# Patient Record
Sex: MALE | Race: ASIAN | NOT HISPANIC OR LATINO | Employment: FULL TIME | URBAN - METROPOLITAN AREA
[De-identification: names, ages, dates, MRNs, and addresses within clinical notes are randomized per-mention and may not be internally consistent; named-entity substitution may affect disease eponyms.]

---

## 2017-01-03 ENCOUNTER — TELEPHONE (OUTPATIENT)
Dept: ENDOCRINOLOGY | Facility: CLINIC | Age: 42
End: 2017-01-03

## 2017-01-03 DIAGNOSIS — E05.90 HYPERTHYROIDISM: Primary | ICD-10-CM

## 2017-01-03 NOTE — TELEPHONE ENCOUNTER
[facility] Call Center    Phone Message    Name of Caller: Brandon    Phone Number: Cell number on file:    Telephone Information:   Mobile [phone]       Best time to return call: any    May a detailed message be left on voicemail: yes    Relation to patient: Self    Reason for Call: Requesting Results   Name/type of test: endo labs  Date of test: in chart  Was test done at a location other than [facility] (Please fill in the location if not [facility])?: No      Action Taken: Message routed to:  Adult Clinics: Endocrinology p [phone]

## 2017-01-04 NOTE — TELEPHONE ENCOUNTER
Please call patient back. [phone]. He called twice for results and no one has called him back.  Endo requested message to care team.

## 2017-01-04 NOTE — TELEPHONE ENCOUNTER
"Per Epic letter section, Dr. Mercado sent letter to patient on 12/31/16:  \"Dear Brandon:    Thank you for allowing me to participate in your care. Your recent test results were reviewed and listed below.      Based on this results you have Graves' disease causing hyperthyroidism. Continue taking medications that were prescribed to you and follow up as scheduled.    Your results are provided below for your review      ENDO THYROID LABS-[facility] Latest Ref Rng 12/23/2016   TSH 0.40 - 4.00 mU/L <0.01 (L)   T4 FREE 0.76 - 1.46 ng/dL 1.88 (H)   TRIIODOTHYRONINE(T3) 60 - 181 ng/dL 229 (H)   THYR PEROXIDASE EVELIO <35 IU/mL    THYROID STIM IMMUNOG  5.0 (H)       Examination: Nuclear medicine thyroid uptake and scan, on 12/29/2016  3:25 PM.     Indication:  Thyrotoxicosis     Additional Information: None.     Technique:     The patient received 204 uci of I-123 orally.  Uptake measurements and  scan was obtained at 24 hours.     Findings:     The laboratory assessment for hyperthyroidism determined elevated T3,  T4, and thyroid-stimulating immunoglobulins with undetectable TSH.     The uptake at 24 hours was measured at 65.7%.  The normal range at 24  hours is from 10 to 30%.  Uptake on right: 34.1%,  Uptake on Left: 31.6%.     Total computer estimated weight of the gland: 62.1 gm,  Right gland  weight: 34.4 gm,  Left gland weight: 27.7 gm.     Images of the gland demonstrates normal appearance of the right and  left lobes. No additional findings. No autonomous nodules were  identified.                                                                       Impression: 65.7% uptake at 24 hours is increased, consistent with  Graves' disease.    Thank you for choosing [place]. As a result, please continue with the treatment plan discussed in the office. Return as discussed or sooner if symptoms worsens or fail to improve. If you have any further questions or concerns, please do not hesitate to contact us.      Sincerely,  Jayson" "Jess  [facility] ENDOCRINOLOGY  [address] [address]  Phone: [phone]  Fax: [phone] \"          Patient advised of above results and recommendations from Dr. Mercado. Patient verbalizes understanding. Patient reports that he does need a refill on his Methimazole 10 mg TID. He notes that for some reason all his pills have broke into pieces and he is is trying to make sure he is actually getting the correct dosing.     Will send to Dr. Mercado for refill on Methimazole (not on endocrine refill protocol).       Carine Diggs, RN  Endocrine Care Coordinator  [facility]      "

## 2017-01-05 RX ORDER — METHIMAZOLE 10 MG/1
10 TABLET ORAL 3 TIMES DAILY
Qty: 90 TABLET | Refills: 0 | Status: SHIPPED | OUTPATIENT
Start: 2017-01-05 | End: 2017-02-17

## 2017-02-17 ENCOUNTER — OFFICE VISIT (OUTPATIENT)
Dept: ENDOCRINOLOGY | Facility: CLINIC | Age: 42
End: 2017-02-17
Payer: COMMERCIAL

## 2017-02-17 VITALS
BODY MASS INDEX: 28.06 KG/M2 | HEART RATE: 56 BPM | SYSTOLIC BLOOD PRESSURE: 123 MMHG | HEIGHT: 65 IN | DIASTOLIC BLOOD PRESSURE: 80 MMHG | WEIGHT: 168.43 LBS

## 2017-02-17 DIAGNOSIS — E05.90 HYPERTHYROIDISM: ICD-10-CM

## 2017-02-17 LAB
ALT SERPL W P-5'-P-CCNC: 45 U/L (ref 0–70)
BASOPHILS # BLD AUTO: 0 10E9/L (ref 0–0.2)
BASOPHILS NFR BLD AUTO: 0.2 %
DIFFERENTIAL METHOD BLD: NORMAL
EOSINOPHIL # BLD AUTO: 0.2 10E9/L (ref 0–0.7)
EOSINOPHIL NFR BLD AUTO: 2.4 %
ERYTHROCYTE [DISTWIDTH] IN BLOOD BY AUTOMATED COUNT: 13.9 % (ref 10–15)
HCT VFR BLD AUTO: 47.6 % (ref 40–53)
HGB BLD-MCNC: 16.7 G/DL (ref 13.3–17.7)
LYMPHOCYTES # BLD AUTO: 3.5 10E9/L (ref 0.8–5.3)
LYMPHOCYTES NFR BLD AUTO: 40.1 %
MCH RBC QN AUTO: 31 PG (ref 26.5–33)
MCHC RBC AUTO-ENTMCNC: 35.1 G/DL (ref 31.5–36.5)
MCV RBC AUTO: 88 FL (ref 78–100)
MONOCYTES # BLD AUTO: 0.7 10E9/L (ref 0–1.3)
MONOCYTES NFR BLD AUTO: 7.8 %
NEUTROPHILS # BLD AUTO: 4.4 10E9/L (ref 1.6–8.3)
NEUTROPHILS NFR BLD AUTO: 49.5 %
PLATELET # BLD AUTO: 180 10E9/L (ref 150–450)
RBC # BLD AUTO: 5.39 10E12/L (ref 4.4–5.9)
T4 FREE SERPL-MCNC: 0.85 NG/DL (ref 0.76–1.46)
TSH SERPL DL<=0.05 MIU/L-ACNC: <0.01 MU/L (ref 0.4–4)
WBC # BLD AUTO: 8.8 10E9/L (ref 4–11)

## 2017-02-17 PROCEDURE — 99214 OFFICE O/P EST MOD 30 MIN: CPT | Performed by: INTERNAL MEDICINE

## 2017-02-17 PROCEDURE — 84439 ASSAY OF FREE THYROXINE: CPT | Performed by: INTERNAL MEDICINE

## 2017-02-17 PROCEDURE — 84443 ASSAY THYROID STIM HORMONE: CPT | Performed by: INTERNAL MEDICINE

## 2017-02-17 PROCEDURE — 84460 ALANINE AMINO (ALT) (SGPT): CPT | Performed by: INTERNAL MEDICINE

## 2017-02-17 PROCEDURE — 36415 COLL VENOUS BLD VENIPUNCTURE: CPT | Performed by: INTERNAL MEDICINE

## 2017-02-17 PROCEDURE — 85025 COMPLETE CBC W/AUTO DIFF WBC: CPT | Performed by: INTERNAL MEDICINE

## 2017-02-17 RX ORDER — METHIMAZOLE 10 MG/1
10 TABLET ORAL 3 TIMES DAILY
Qty: 90 TABLET | Refills: 0 | Status: SHIPPED | OUTPATIENT
Start: 2017-02-17 | End: 2017-02-20

## 2017-02-17 NOTE — MR AVS SNAPSHOT
After Visit Summary   2/17/2017    Brandon Macias    MRN: [id number]           Patient Information     Date Of Birth          1975        Visit Information        Provider Department      2/17/2017 11:00 AM Jayson Mercado MD [facility]        Today's Diagnoses     Hyperthyroidism          Care Instructions    Thank you for choosing the [facility], Department of Endocrinology. It has been a pleasure servicing you today. Please contact us at [phone] with any questions. If you need to speak to an Endocrinologist and it is after 5:00 pm or on a weekend, please call the On-Call Endocrinologist at [phone].          Follow-ups after your visit        Additional Services     OPHTHALMOLOGY ADULT REFERRAL       Your provider has referred you to: [facility]: [facility] [phone]   [email or web address]    Please be aware that coverage of these services is subject to the terms and limitations of your health insurance plan.  Call member services at your health plan with any benefit or coverage questions.      Please bring the following with you to your appointment:    (1) Any X-Rays, CTs or MRIs which have been performed.  Contact the facility where they were done to arrange for  prior to your scheduled appointment.    (2) List of current medications  (3) This referral request   (4) Any documents/labs given to you for this referral                  Your next 10 appointments already scheduled     May 25, 2017  6:30 PM CDT   LAB with BK LAB   [facility]    [address] [place] [address] [phone]           Patient must bring picture ID.  Patient should be prepared to give a urine specimen  Please do not eat 10-12 hours before your appointment if you are coming in fasting for labs on lipids,  cholesterol, or glucose (sugar).  Pregnant women should follow their Care Team instructions. Water with medications is okay. Do not drink coffee or other fluids.   If you have concerns about taking  your medications, please ask at office or if scheduling via [employer], send a message by clicking on Secure Messaging, Message Your Care Team.            Jun 02, 2017  4:00 PM CDT   Return Visit with Jayson Mercado MD   [facility] ([facility]    [address] [place] [address] [phone]              Future tests that were ordered for you today     Open Standing Orders        Priority Remaining Interval Expires Ordered    TSH Routine 4/4 12 months 2/17/2018 2/17/2017    T4 free Routine 4/4 12 months 2/17/2018 2/17/2017    **CBC with platelets differential FUTURE 2mo Routine 4/4 12 months 2/17/2018 2/17/2017    ALT Routine 4/4 12 months 2/17/2018 2/17/2017            Who to contact     If you have questions or need follow up information about today's clinic visit or your schedule please contact [facility] directly at [phone].  Normal or non-critical lab and imaging results will be communicated to you by [employer], letter or phone within 4 business days after the clinic has received the results. If you do not hear from us within 7 days, please contact the clinic through [employer] or phone. If you have a critical or abnormal lab result, we will notify you by phone as soon as possible.  Submit refill requests through [employer] or call your pharmacy and they will forward the refill request to us. Please allow 3 business days for your refill to be completed.          Additional Information About Your Visit        [employer] Information     [employer] is an electronic gateway that provides easy, online access to your medical records. With [employer], you can request a clinic appointment, read your test results, renew a prescription or communicate with  "your care team.     To sign up for [employer] visit the website at [email or web address]   You will be asked to enter the access code listed below, as well as some personal information. Please follow the directions to create your username and password.     Your access code is: [email or web address]  Expires: 2017 11:01 AM     Your access code will  in 90 days. If you need help or a new code, please contact your [facility] or call [phone] for assistance.        Care EveryWhere ID     This is your Care EveryWhere ID. This could be used by other organizations to access your [facility] medical records  [id number]        Your Vitals Were     Pulse Height BMI (Body Mass Index)             56 1.651 m (5' 5\") 28.03 kg/m2          Blood Pressure from Last 3 Encounters:   17 123/80   16 118/65   16 124/69    Weight from Last 3 Encounters:   17 76.4 kg (168 lb 6.9 oz)   16 72.7 kg (160 lb 4.8 oz)   16 72.1 kg (159 lb)              We Performed the Following     OPHTHALMOLOGY ADULT REFERRAL          Where to get your medicines      These medications were sent to [id number] [address] [facility]  [address] [facility] [address]    Hours:  24-hours Phone:  [phone]     methimazole 10 MG tablet          Primary Care Provider Office Phone # Fax #    Jack Horton [phone]       [facility] [address]  [place] [address]        Thank you!     Thank you for choosing [facility]  for your care. Our goal is always to provide you with excellent care. Hearing back from our patients is one way we can continue to improve our services. Please take a few minutes to complete the written survey that you may receive in the mail after your visit with us. Thank you!             Your Updated Medication List - Protect others around you: " Learn how to safely use, store and throw away your medicines at www.disposemymeds.org.          This list is accurate as of: 2/17/17 11:01 AM.  Always use your most recent med list.                   Brand Name Dispense Instructions for use    atenolol 50 MG tablet    TENORMIN    90 tablet    Take 1 tablet (50 mg) by mouth daily       methimazole 10 MG tablet    TAPAZOLE    90 tablet    Take 1 tablet (10 mg) by mouth 3 times daily

## 2017-02-17 NOTE — PROGRESS NOTES
Endocrine Clinic Consult:     Reason for consult: Hyperthyroidism  Date: December 23, 2016  Referring Physician: Jack Horton      HPI:     Brandon Macias is a 41 year old year old male who presents for evaluation of  Hyperthyroidism. He went to routine physical exam and reported weight loss for 2 months and occasional palpitations in episodes lasting 5 minutes.  Thyroid function test was obtained at that time.  He was started on Methimazole and Atenolol 50 mg daily.     ENDO THYROID LABS-[facility] Latest Ref Rng 11/25/2016 11/18/2016   TSH 0.40 - 4.00 mU/L <0.01 (L) <0.01 (L)   T4 FREE 0.76 - 1.46 ng/dL 2.56 (H) 3.50 (H)   THYR PEROXIDASE EVELIO <35 IU/mL <10      He presents today after 8 weeks of Methimazole for a follow up visit.   ENDO VITALS-[facility] 2/17/2017 12/23/2016   Weight 76.4 kg 72.712 kg   Weight 168 lb 6.9 oz 160 lb 4.8 oz   Height 65 in 65 in   /80 118/65   Pulse 56 62   BSA (Calculated - sq m) 1.87 1.83   BMI (Calculated) 28.09 26.73     Methimazole held, EDUARDO performed   65.7% uptake at 24 hours is increased, consistent with  Graves' disease.    T/t discussed, patient chose to continue Methimazole.     He has gained about 8 lbs and feels well. He denies having any new symptoms. He has good energy, good sleep.   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     Denies having felt a goiter, neck pain, difficulty in swallowing, difficulty in breathing and changes in voice.     Risk Factors:   No history of childhood radiation to head and neck, No history of thyroid cancer in the family, No history of goiter in the family and No history of autoimmune disorders in the family.       Clinical symptom assessment  Temperature: No temperature intolerance  General: no change in energy level  Eye puffiness no  Tiredness / Fatigue no  Weight gain no, gained weight 8 lbs  Neurological: Difficult with concentration / somnolence no  CVS: Palpitation / History of CAD / Lipid disorder no  Respiratory  "Shortness of breath no  GI No diarrhea  Skin and hair:  No change  Extremity Swelling no  Musculoskeletal: Cramps  no      Other ROS:   No eye symptoms  No headache, change in vision, loss of peripheral vision  Other ROS that were obtained were negative.     No significant illness in the past    Sister with Graves Disease    Past Surgical History   Procedure Laterality Date     No history of surgery       Family History   Problem Relation Age of Onset     Family History Negative       Social History     Social History     Marital status:      Spouse name: N/A     Number of children: N/A     Years of education: N/A     Occupational History     Not on file.     Social History Main Topics     Smoking status: Never Smoker     Smokeless tobacco: Never Used     Alcohol use Yes     Drug use: No     Sexual activity: Yes     Partners: Female     Other Topics Concern     Not on file     Social History Narrative        Allergies   Allergen Reactions     No Known Drug Allergies      Current Outpatient Prescriptions   Medication     methimazole (TAPAZOLE) 10 MG tablet     atenolol (TENORMIN) 50 MG tablet     [DISCONTINUED] methimazole (TAPAZOLE) 10 MG tablet     No current facility-administered medications for this visit.      Social history  Does not smoke.   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     Exam:  /80  Pulse 56  Ht 1.651 m (5' 5\")  Wt 76.4 kg (168 lb 6.9 oz)  BMI 28.03 kg/m2     Constitutional: healthy, alert, no distress and cooperative  Head: Normocephalic. No masses, lesions, tenderness or abnormalities  Neck: Neck supple. No adenopathy. Thyroid palpable normal size, Carotids without bruits.  ENT: No throat congestion, no neck nodes or sinus tenderness  Cardiovascular: regular rhythm, no tachycardia  Respiratory: Lungs clear  Gastrointestinal: Abdomen soft, non-tender. BS normal. No striae  Musculoskeletal: gait normal and normal muscle tone  Neurologic: Normal speech, reflexes normal. "   Psychiatric: mentation appears normal       TSH   Date Value Ref Range Status   12/23/2016 <0.01 (L) 0.40 - 4.00 mU/L Final   11/25/2016 <0.01 (L) 0.40 - 4.00 mU/L Final   11/18/2016 <0.01 (L) 0.40 - 4.00 mU/L Final       T4 Free   Date Value Ref Range Status   12/23/2016 1.88 (H) 0.76 - 1.46 ng/dL Final   11/25/2016 2.56 (H) 0.76 - 1.46 ng/dL Final   11/18/2016 3.50 (H) 0.76 - 1.46 ng/dL Final   ]    CBC RESULTS:   Recent Labs   Lab Test  11/18/16   0747   WBC  7.7   RBC  5.02   HGB  15.2   HCT  44.4   MCV  88   MCH  30.3   MCHC  34.2   RDW  13.1   PLT  186     Recent Labs   Lab Test  11/18/16   0747 03/03/16 03/02/16   0952   NA  139  142  135   POTASSIUM  3.7  3.7  4.0   CHLORIDE  103  107  101   CO2  29   --   27   ANIONGAP  7  8.0  7   GLC  84  137*  130*   BUN  15  14  14   CR  0.62*  0.71  0.64*   TARA  9.0  7.5  8.8     ENDO THYROID LABS-[facility] Latest Ref Rng 11/25/2016 11/18/2016   TSH 0.40 - 4.00 mU/L <0.01 (L) <0.01 (L)   T4 FREE 0.76 - 1.46 ng/dL 2.56 (H) 3.50 (H)   THYR PEROXIDASE EVELIO <35 IU/mL <10        Assessment   Brandon Macias is a 41 year old years old male who is here for evaluation of hyperthyroidism due to Graves disease He has family history of Graves disease. He is currently on Methimazole 10 mg 3 times a day with Atenolol and is asymptomatic.      We again discussed about side effects of methimazole including agranulocytosis, LFT changes, and need for test if he develops fever or sore throat. Standing labs ordered.     If FT4 improving then, reduce dose of Methimazole.     3 month FU with a lab 1 week prior to the visit.    Jayson Mercado MD  [id number]  Endocrinology Service

## 2017-02-17 NOTE — PATIENT INSTRUCTIONS
Thank you for choosing the [facility], Department of Endocrinology. It has been a pleasure servicing you today. Please contact us at [phone] with any questions. If you need to speak to an Endocrinologist and it is after 5:00 pm or on a weekend, please call the On-Call Endocrinologist at [phone].

## 2017-02-17 NOTE — LETTER
2/17/2017      RE: Brandon Macias  [address]  [facility] [address]     Dear Colleague,    Thank you for referring your patient, Brandon Macias, to the [facility]. Please see a copy of my visit note below.    Endocrine Clinic Consult:     Reason for consult: Hyperthyroidism  Date: December 23, 2016  Referring Physician: Jack Horton      HPI:     Brandon Macias is a 41 year old year old male who presents for evaluation of  Hyperthyroidism. He went to routine physical exam and reported weight loss for 2 months and occasional palpitations in episodes lasting 5 minutes.  Thyroid function test was obtained at that time.  He was started on Methimazole and Atenolol 50 mg daily.     ENDO THYROID LABS-[facility] Latest Ref Rng 11/25/2016 11/18/2016   TSH 0.40 - 4.00 mU/L <0.01 (L) <0.01 (L)   T4 FREE 0.76 - 1.46 ng/dL 2.56 (H) 3.50 (H)   THYR PEROXIDASE EVELIO <35 IU/mL <10      He presents today after 8 weeks of Methimazole for a follow up visit.   ENDO VITALS-[facility] 2/17/2017 12/23/2016   Weight 76.4 kg 72.712 kg   Weight 168 lb 6.9 oz 160 lb 4.8 oz   Height 65 in 65 in   /80 118/65   Pulse 56 62   BSA (Calculated - sq m) 1.87 1.83   BMI (Calculated) 28.09 26.73     Methimazole held, EDUARDO performed   65.7% uptake at 24 hours is increased, consistent with  Graves' disease.    T/t discussed, patient chose to continue Methimazole.     He has gained about 8 lbs and feels well. He denies having any new symptoms. He has good energy, good sleep.   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     Denies having felt a goiter, neck pain, difficulty in swallowing, difficulty in breathing and changes in voice.     Risk Factors:   No history of childhood radiation to head and neck, No history of thyroid cancer in the family, No history of goiter in the family and No history of autoimmune disorders in the family.       Clinical symptom assessment  Temperature: No temperature intolerance  General: no change  "in energy level  Eye puffiness no  Tiredness / Fatigue no  Weight gain no, gained weight 8 lbs  Neurological: Difficult with concentration / somnolence no  CVS: Palpitation / History of CAD / Lipid disorder no  Respiratory Shortness of breath no  GI No diarrhea  Skin and hair:  No change  Extremity Swelling no  Musculoskeletal: Cramps  no      Other ROS:   No eye symptoms  No headache, change in vision, loss of peripheral vision  Other ROS that were obtained were negative.     No significant illness in the past    Sister with Graves Disease    Past Surgical History   Procedure Laterality Date     No history of surgery       Family History   Problem Relation Age of Onset     Family History Negative       Social History     Social History     Marital status:      Spouse name: N/A     Number of children: N/A     Years of education: N/A     Occupational History     Not on file.     Social History Main Topics     Smoking status: Never Smoker     Smokeless tobacco: Never Used     Alcohol use Yes     Drug use: No     Sexual activity: Yes     Partners: Female     Other Topics Concern     Not on file     Social History Narrative        Allergies   Allergen Reactions     No Known Drug Allergies      Current Outpatient Prescriptions   Medication     methimazole (TAPAZOLE) 10 MG tablet     atenolol (TENORMIN) 50 MG tablet     [DISCONTINUED] methimazole (TAPAZOLE) 10 MG tablet     No current facility-administered medications for this visit.      Social history  Does not smoke.   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     Exam:  /80  Pulse 56  Ht 1.651 m (5' 5\")  Wt 76.4 kg (168 lb 6.9 oz)  BMI 28.03 kg/m2     Constitutional: healthy, alert, no distress and cooperative  Head: Normocephalic. No masses, lesions, tenderness or abnormalities  Neck: Neck supple. No adenopathy. Thyroid palpable normal size, Carotids without bruits.  ENT: No throat congestion, no neck nodes or sinus tenderness  Cardiovascular: " regular rhythm, no tachycardia  Respiratory: Lungs clear  Gastrointestinal: Abdomen soft, non-tender. BS normal. No striae  Musculoskeletal: gait normal and normal muscle tone  Neurologic: Normal speech, reflexes normal.   Psychiatric: mentation appears normal       TSH   Date Value Ref Range Status   12/23/2016 <0.01 (L) 0.40 - 4.00 mU/L Final   11/25/2016 <0.01 (L) 0.40 - 4.00 mU/L Final   11/18/2016 <0.01 (L) 0.40 - 4.00 mU/L Final       T4 Free   Date Value Ref Range Status   12/23/2016 1.88 (H) 0.76 - 1.46 ng/dL Final   11/25/2016 2.56 (H) 0.76 - 1.46 ng/dL Final   11/18/2016 3.50 (H) 0.76 - 1.46 ng/dL Final   ]    CBC RESULTS:   Recent Labs   Lab Test  11/18/16   0747   WBC  7.7   RBC  5.02   HGB  15.2   HCT  44.4   MCV  88   MCH  30.3   MCHC  34.2   RDW  13.1   PLT  186     Recent Labs   Lab Test  11/18/16   0747 03/03/16 03/02/16   0952   NA  139  142  135   POTASSIUM  3.7  3.7  4.0   CHLORIDE  103  107  101   CO2  29   --   27   ANIONGAP  7  8.0  7   GLC  84  137*  130*   BUN  15  14  14   CR  0.62*  0.71  0.64*   TARA  9.0  7.5  8.8     ENDO THYROID LABS-[facility] Latest Ref Rng 11/25/2016 11/18/2016   TSH 0.40 - 4.00 mU/L <0.01 (L) <0.01 (L)   T4 FREE 0.76 - 1.46 ng/dL 2.56 (H) 3.50 (H)   THYR PEROXIDASE EVELIO <35 IU/mL <10        Assessment   Brandon Macias is a 41 year old years old male who is here for evaluation of hyperthyroidism due to Graves disease He has family history of Graves disease. He is currently on Methimazole 10 mg 3 times a day with Atenolol and is asymptomatic.      We again discussed about side effects of methimazole including agranulocytosis, LFT changes, and need for test if he develops fever or sore throat. Standing labs ordered.     If FT4 improving then, reduce dose of Methimazole.     3 month FU with a lab 1 week prior to the visit.    Jayson Mercado MD  [id number]  Endocrinology Service            Again, thank you for allowing me to participate in the care of your patient.       Sincerely,    Jayson Mercado MD

## 2017-02-20 DIAGNOSIS — E05.90 HYPERTHYROIDISM: ICD-10-CM

## 2017-02-20 RX ORDER — METHIMAZOLE 5 MG/1
5 TABLET ORAL DAILY
Qty: 90 TABLET | Refills: 3 | Status: SHIPPED | OUTPATIENT
Start: 2017-02-20 | End: 2019-04-15

## 2017-02-20 NOTE — TELEPHONE ENCOUNTER
Patient notified of recommendations. He is agreeable with plan. He will schedule a lab appt in 2 months at a local  Clinic. He had no further questions at this time    Cheyenne Garcia LPN  Adult Endocrinology  [facility]    Per Dr. Mercado Message    Hi Cheyenne,     Could you please communicate with patient regarding the dose reduction to once a day and retest in 2 months.     Also, please ask him to stop Atenolol that he takes daily.     Thanks   Jayson Mercado MD   [id number]   Endocrinology Service   (Routing comment)

## 2017-02-20 NOTE — TELEPHONE ENCOUNTER
[facility] Call Center    Phone Message    Name of Caller: Brandon    Phone Number: Home number on file [phone] (home) or Cell number on file:    Telephone Information:   Mobile [phone]       Best time to return call: Any    May a detailed message be left on voicemail: yes    Relation to patient: Self    Reason for Call: Medication Refill Request    Has the patient contacted the pharmacy for the refill? Yes  Medication: methimazole (TAPAZOLE) 10 MG tablet [28756]   Pharmacy: [facility] [address] [place] [address] AT [facility]--Patient is scheduled on 06/02/17 with Dr. Mercado. Please advise.       Action Taken: Message routed to:  Adult Clinics: Endocrinology p [phone]

## 2017-02-20 NOTE — TELEPHONE ENCOUNTER
Medication not on Endocrinology Refill Protocol. Will route to Dr. Mercado for review.    Zeinab Mendoza LPN  Adult Endocrinology  [facility]

## 2017-02-21 NOTE — PROGRESS NOTES
VM left  Sent msg to team regarding dose adjustment based on the lab results.     Methimazole 5 mg daily    Jayson Mercado MD  [id number]  Endocrinology Service

## 2017-05-16 ENCOUNTER — DOCUMENTATION ONLY (OUTPATIENT)
Dept: LAB | Facility: CLINIC | Age: 42
End: 2017-05-16

## 2017-05-16 NOTE — PROGRESS NOTES
Patient is scheduled for a Lab appointment on 5/25/2017 for labs for Dr. Mercado.  Please enter future orders, or call to advise patient to cancel appointment if labs are not needed.  Thank you.

## 2019-02-18 ENCOUNTER — OFFICE VISIT (OUTPATIENT)
Dept: OPTOMETRY | Facility: CLINIC | Age: 44
End: 2019-02-18
Payer: COMMERCIAL

## 2019-02-18 DIAGNOSIS — H11.31 SUBCONJUNCTIVAL HEMORRHAGE OF RIGHT EYE: ICD-10-CM
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
## 2019-02-18 PROCEDURE — 92002 INTRM OPH EXAM NEW PATIENT: CPT | Performed by: OPTOMETRIST

## 2019-02-18 ASSESSMENT — SLIT LAMP EXAM - LIDS
COMMENTS: NORMAL
COMMENTS: NORMAL

## 2019-02-18 ASSESSMENT — TONOMETRY
OS_IOP_MMHG: 27
OD_IOP_MMHG: 27
OS_IOP_MMHG: 17
IOP_METHOD: APPLANATION
IOP_METHOD: TONOPEN
OD_IOP_MMHG: 17
OS_IOP_MMHG: 26
OD_IOP_MMHG: 25
IOP_METHOD: TONOPEN

## 2019-02-18 ASSESSMENT — CUP TO DISC RATIO
OD_RATIO: 0.2
OS_RATIO: 0.3

## 2019-02-18 ASSESSMENT — CONF VISUAL FIELD
OD_NORMAL: 1
OS_NORMAL: 1
METHOD: COUNTING FINGERS

## 2019-02-18 ASSESSMENT — VISUAL ACUITY
OD_SC+: -2
OS_SC: 20/20
OD_SC: 20/20
METHOD: SNELLEN - LINEAR

## 2019-02-18 ASSESSMENT — EXTERNAL EXAM - RIGHT EYE: OD_EXAM: NORMAL

## 2019-02-18 ASSESSMENT — EXTERNAL EXAM - LEFT EYE: OS_EXAM: NORMAL
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
## 2019-02-18 NOTE — PATIENT INSTRUCTIONS
You have a broken blood vessel in your eye.  The redness may take a few weeks to resolve.  Visine or Clear Eyes drops will not make the redness go away any faster.  Sometimes it looks worse before it gets better.    Recommend annual eye exams.    Wander Mandujano, JAVIER    The affects of the dilating drops last for 4- 6 hours.  You will be more sensitive to light and vision will be blurry up close.  Mydriatic sunglasses were given if needed.      Optometry Providers       Clinic Locations                                 Telephone Number   Dr. Dyana Dang [facility] [place]   [place] [phone]     [facility] Hours:                [facility] Hours:       [facility] Hours:   [address]   [address] [facility]     [address] [facility] [place] [address]   [place] [address]    [place] [address]  Phone: [phone]                    Phone: [phone]     Phone: [phone]                      Monday 8:00-7:00                          Monday 8:00-7:00                          Monday 8:00-7:00              Tuesday 8:00-6:00                          Tuesday 8:00-7:00                          Tuesday 8:00-7:00              Wednesday 8:00-6:00                  Wednesday 8:00-7:00                   Wednesday 8:00-7:00      Thursday 8:00-6:00                        Thursday 8:00-7:00                         Thursday 8:00-7:00            Friday 8:00-5:00                              Friday 8:00-5:00                              Friday 8:00-5:00    [facility] Hours:   [address] [facility] [address] [id number] [phone]    Monday 8:00-7:00  Tuesday 8:00-7:00  Wednesday 8:00-7:00  Thursday 8:00-7:00  Friday 8:00-5:00  Please log on to [email or web address] to order your contact lenses.  The link is found on the [facility] page.  As always, Thank you for trusting  us with your health care needs!
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
## 2019-04-15 ENCOUNTER — OFFICE VISIT (OUTPATIENT)
Dept: FAMILY MEDICINE | Facility: CLINIC | Age: 44
End: 2019-04-15
Payer: COMMERCIAL

## 2019-04-15 VITALS
TEMPERATURE: 98.6 F | DIASTOLIC BLOOD PRESSURE: 73 MMHG | OXYGEN SATURATION: 95 % | BODY MASS INDEX: 28.64 KG/M2 | SYSTOLIC BLOOD PRESSURE: 123 MMHG | HEIGHT: 65 IN | RESPIRATION RATE: 18 BRPM | HEART RATE: 74 BPM | WEIGHT: 171.9 LBS

## 2019-04-15 DIAGNOSIS — J00 ACUTE NASOPHARYNGITIS: Primary | ICD-10-CM

## 2019-04-15 DIAGNOSIS — R01.1 UNDIAGNOSED CARDIAC MURMURS: ICD-10-CM

## 2019-04-15 DIAGNOSIS — E05.00 GRAVES DISEASE: ICD-10-CM

## 2019-04-15 LAB
ANION GAP SERPL CALCULATED.3IONS-SCNC: 11 MMOL/L (ref 3–14)
BUN SERPL-MCNC: 13 MG/DL (ref 7–30)
CALCIUM SERPL-MCNC: 8.9 MG/DL (ref 8.5–10.1)
CHLORIDE SERPL-SCNC: 107 MMOL/L (ref 94–109)
CO2 SERPL-SCNC: 23 MMOL/L (ref 20–32)
CREAT SERPL-MCNC: 0.66 MG/DL (ref 0.66–1.25)
DEPRECATED S PYO AG THROAT QL EIA: NORMAL
ERYTHROCYTE [DISTWIDTH] IN BLOOD BY AUTOMATED COUNT: 13.5 % (ref 10–15)
FLUAV+FLUBV AG SPEC QL: NEGATIVE
FLUAV+FLUBV AG SPEC QL: NEGATIVE
GFR SERPL CREATININE-BSD FRML MDRD: >90 ML/MIN/{1.73_M2}
GLUCOSE SERPL-MCNC: 100 MG/DL (ref 70–99)
HCT VFR BLD AUTO: 46.6 % (ref 40–53)
HGB BLD-MCNC: 16.2 G/DL (ref 13.3–17.7)
MCH RBC QN AUTO: 30.3 PG (ref 26.5–33)
MCHC RBC AUTO-ENTMCNC: 34.8 G/DL (ref 31.5–36.5)
MCV RBC AUTO: 87 FL (ref 78–100)
PLATELET # BLD AUTO: 159 10E9/L (ref 150–450)
POTASSIUM SERPL-SCNC: 3.8 MMOL/L (ref 3.4–5.3)
RBC # BLD AUTO: 5.34 10E12/L (ref 4.4–5.9)
SODIUM SERPL-SCNC: 141 MMOL/L (ref 133–144)
SPECIMEN SOURCE: NORMAL
SPECIMEN SOURCE: NORMAL
T3 SERPL-MCNC: 353 NG/DL (ref 60–181)
T4 FREE SERPL-MCNC: 4.43 NG/DL (ref 0.76–1.46)
TSH SERPL DL<=0.005 MIU/L-ACNC: <0.01 MU/L (ref 0.4–4)
WBC # BLD AUTO: 6.8 10E9/L (ref 4–11)

## 2019-04-15 PROCEDURE — 99214 OFFICE O/P EST MOD 30 MIN: CPT | Performed by: PHYSICIAN ASSISTANT

## 2019-04-15 PROCEDURE — 87804 INFLUENZA ASSAY W/OPTIC: CPT | Performed by: PHYSICIAN ASSISTANT

## 2019-04-15 PROCEDURE — 84480 ASSAY TRIIODOTHYRONINE (T3): CPT | Performed by: PHYSICIAN ASSISTANT

## 2019-04-15 PROCEDURE — 85027 COMPLETE CBC AUTOMATED: CPT | Performed by: PHYSICIAN ASSISTANT

## 2019-04-15 PROCEDURE — 87081 CULTURE SCREEN ONLY: CPT | Performed by: PHYSICIAN ASSISTANT

## 2019-04-15 PROCEDURE — 84439 ASSAY OF FREE THYROXINE: CPT | Performed by: PHYSICIAN ASSISTANT

## 2019-04-15 PROCEDURE — 84443 ASSAY THYROID STIM HORMONE: CPT | Performed by: PHYSICIAN ASSISTANT

## 2019-04-15 PROCEDURE — 36415 COLL VENOUS BLD VENIPUNCTURE: CPT | Performed by: PHYSICIAN ASSISTANT

## 2019-04-15 PROCEDURE — 87880 STREP A ASSAY W/OPTIC: CPT | Performed by: PHYSICIAN ASSISTANT

## 2019-04-15 PROCEDURE — 80048 BASIC METABOLIC PNL TOTAL CA: CPT | Performed by: PHYSICIAN ASSISTANT

## 2019-04-15 RX ORDER — GUAIFENESIN AND DEXTROMETHORPHAN HYDROBROMIDE 1200; 60 MG/1; MG/1
1 TABLET, EXTENDED RELEASE ORAL 2 TIMES DAILY
Qty: 28 TABLET | Refills: 0 | Status: SHIPPED | OUTPATIENT
Start: 2019-04-15 | End: 2021-09-16

## 2019-04-15 RX ORDER — METHIMAZOLE 5 MG/1
5 TABLET ORAL DAILY
Qty: 90 TABLET | Refills: 3 | Status: SHIPPED | OUTPATIENT
Start: 2019-04-15 | End: 2019-07-23

## 2019-04-15 RX ORDER — ATENOLOL 50 MG/1
50 TABLET ORAL DAILY
Qty: 90 TABLET | Refills: 3 | Status: SHIPPED | OUTPATIENT
Start: 2019-04-15 | End: 2021-09-16

## 2019-04-15 ASSESSMENT — PAIN SCALES - GENERAL: PAINLEVEL: NO PAIN (0)

## 2019-04-15 ASSESSMENT — MIFFLIN-ST. JEOR: SCORE: 1595.57

## 2019-04-15 NOTE — LETTER
April 22, 2019      Brandon Macias  [address]  [facility] [address]        Dear ,    We are writing to inform you of your test results.    Your thyroid labs are high. Untreated hyperthyroid can cause heart failure. Please make sure you take your medications and make appointment with endocrinologist and echocardiogram.     Sincerely,      Charity Lawton PA-C/luis      Resulted Orders   Strep, Rapid Screen   Result Value Ref Range    Specimen Description Throat     Rapid Strep A Screen       NEGATIVE: No Group A streptococcal antigen detected by immunoassay, await culture report.   Influenza A/B antigen   Result Value Ref Range    Influenza A/B Agn Specimen Nasal     Influenza A Negative NEG^Negative    Influenza B Negative NEG^Negative      Comment:      Test results must be correlated with clinical data. If necessary, results   should be confirmed by a molecular assay or viral culture.     Beta strep group A culture   Result Value Ref Range    Specimen Description Throat     Culture Micro No beta hemolytic Streptococcus Group A isolated    TSH   Result Value Ref Range    TSH <0.01 (L) 0.40 - 4.00 mU/L   T4, free   Result Value Ref Range    T4 Free 4.43 (H) 0.76 - 1.46 ng/dL   T3, total   Result Value Ref Range    Triiodothyronine (T3) 353 (H) 60 - 181 ng/dL   CBC with platelets   Result Value Ref Range    WBC 6.8 4.0 - 11.0 10e9/L    RBC Count 5.34 4.4 - 5.9 10e12/L    Hemoglobin 16.2 13.3 - 17.7 g/dL    Hematocrit 46.6 40.0 - 53.0 %    MCV 87 78 - 100 fl    MCH 30.3 26.5 - 33.0 pg    MCHC 34.8 31.5 - 36.5 g/dL    RDW 13.5 10.0 - 15.0 %    Platelet Count 159 150 - 450 10e9/L   Basic metabolic panel  (Ca, Cl, CO2, Creat, Gluc, K, Na, BUN)   Result Value Ref Range    Sodium 141 133 - 144 mmol/L    Potassium 3.8 3.4 - 5.3 mmol/L    Chloride 107 94 - 109 mmol/L    Carbon Dioxide 23 20 - 32 mmol/L    Anion Gap 11 3 - 14 mmol/L    Glucose 100 (H) 70 - 99 mg/dL      Comment:      Fasting  specimen    Urea Nitrogen 13 7 - 30 mg/dL    Creatinine 0.66 0.66 - 1.25 mg/dL    GFR Estimate >90 >60 mL/min/[1.73_m2]      Comment:      Non  GFR Calc  Starting 12/18/2018, serum creatinine based estimated GFR (eGFR) will be   calculated using the Chronic Kidney Disease Epidemiology Collaboration   (CKD-EPI) equation.      GFR Estimate If Black >90 >60 mL/min/[1.73_m2]      Comment:       GFR Calc  Starting 12/18/2018, serum creatinine based estimated GFR (eGFR) will be   calculated using the Chronic Kidney Disease Epidemiology Collaboration   (CKD-EPI) equation.      Calcium 8.9 8.5 - 10.1 mg/dL

## 2019-04-15 NOTE — PROGRESS NOTES
SUBJECTIVE:   Brandon Macias is a 43 year old male who presents to clinic today for the following   health issues:      Acute Illness   Acute illness concerns: strep   Onset: 1 week    Fever: YES- 100    Chills/Sweats: YES- chills    Headache (location?): YES- from coughing     Sinus Pressure:no    Conjunctivitis:  no    Ear Pain: No but feelks stuffed     Rhinorrhea: no    Congestion: no    Sore Throat: YES     Cough: YES-productive of yellow sputum, productive of green sputum    Wheeze: no    Decreased Appetite: no    Nausea: no    Vomiting: no    Diarrhea:  no    Dysuria/Freq.: no    Fatigue/Achiness: no    Sick/Strep Exposure: no     Therapies Tried and outcome: Ibuprofen- gave relief       Hyperthyroidism Follow-up  Patient has stopped all medications in 2017 due to lack of insurance.   Patient currently reports that he gets mild palpitations and weight loss, no edema, no shortness of breath     Since last visit, patient describes the following symptoms:       Additional history: as documented    Reviewed  and updated as needed this visit by clinical staff  Tobacco  Allergies  Meds  Problems  Med Hx  Surg Hx  Fam Hx  Soc Hx          Reviewed and updated as needed this visit by Provider  Tobacco  Allergies  Meds  Problems  Med Hx  Surg Hx  Fam Hx         Patient Active Problem List   Diagnosis     CARDIOVASCULAR SCREENING; LDL GOAL LESS THAN 160     Obesity, Class I, BMI 30-34.9     Positive FIT (fecal immunochemical test)     Graves disease     Past Surgical History:   Procedure Laterality Date     NO HISTORY OF SURGERY         Social History     Tobacco Use     Smoking status: Never Smoker     Smokeless tobacco: Never Used   Substance Use Topics     Alcohol use: Yes     Family History   Problem Relation Age of Onset     Family History Negative Other          Current Outpatient Medications   Medication Sig Dispense Refill     atenolol (TENORMIN) 50 MG tablet Take 1 tablet (50 mg) by mouth daily 90  "tablet 3     Dextromethorphan-Guaifenesin  MG TB12 Take 1 tablet by mouth 2 times daily 28 tablet 0     methimazole (TAPAZOLE) 5 MG tablet Take 1 tablet (5 mg) by mouth daily 90 tablet 3     Allergies   Allergen Reactions     No Known Drug Allergies        ROS:  Constitutional, HEENT, cardiovascular, pulmonary, gi and gu systems are negative, except as otherwise noted.    OBJECTIVE:     /73 (BP Location: Right arm, Patient Position: Sitting, Cuff Size: Adult Large)   Pulse 74   Temp 98.6  F (37  C) (Oral)   Resp 18   Ht 1.657 m (5' 5.25\")   Wt 78 kg (171 lb 14.4 oz)   SpO2 95%   BMI 28.39 kg/m    Body mass index is 28.39 kg/m .  GENERAL: healthy, alert and no distress  EYES: Eyes grossly normal to inspection, PERRL and conjunctivae and sclerae normal  HENT: normal cephalic/atraumatic, ear canals and TM's normal, nose and mouth without ulcers or lesions, oropharynx clear and oral mucous membranes moist  NECK: no adenopathy, no asymmetry, masses, or scars and thyroid normal to palpation  RESP: lungs clear to auscultation - no rales, rhonchi or wheezes  CV: regular rate and rhythm, normal S1 S2, no S3 or S4, systolic rub is best heard at left sternal border, no peripheral edema and peripheral pulses strong  ABDOMEN: soft, nontender, no hepatosplenomegaly, no masses and bowel sounds normal  MS: no gross musculoskeletal defects noted, no edema    Diagnostic Test Results:  Results for orders placed or performed in visit on 04/15/19 (from the past 24 hour(s))   Strep, Rapid Screen   Result Value Ref Range    Specimen Description Throat     Rapid Strep A Screen       NEGATIVE: No Group A streptococcal antigen detected by immunoassay, await culture report.   Influenza A/B antigen   Result Value Ref Range    Influenza A/B Agn Specimen Nasal     Influenza A Negative NEG^Negative    Influenza B Negative NEG^Negative       ASSESSMENT/PLAN:       ICD-10-CM    1. Acute nasopharyngitis J00 Strep, Rapid Screen    "  Beta strep group A culture     Dextromethorphan-Guaifenesin  MG TB12   2. Graves disease E05.00 TSH     T4, free     T3, total     Echocardiogram Complete     CBC with platelets     Basic metabolic panel  (Ca, Cl, CO2, Creat, Gluc, K, Na, BUN)     ENDOCRINOLOGY ADULT REFERRAL     methimazole (TAPAZOLE) 5 MG tablet     atenolol (TENORMIN) 50 MG tablet   3. Undiagnosed cardiac murmurs R01.1 Echocardiogram Complete     CBC with platelets     Basic metabolic panel  (Ca, Cl, CO2, Creat, Gluc, K, Na, BUN)   1.Mucinex Dm 1 tablet twice a day for 10 days for cough and mucous    2.Please call  Radiology at [phone] to schedule your appointment for echocardiogram as soon as possible   Restart Atenolol 50 mg daily and Methimazole 5 mg daily   Make appointment with endocrinologist as soon as possible     3. Heart murmur is most likely due to untreated Graves  Patient will restart his medications and schedule echo as soon as possible.        Charity Lawton PA-C  [facility]

## 2019-04-15 NOTE — PATIENT INSTRUCTIONS
Please call  Radiology at [phone] to schedule your appointment for echocardiogram as soon as possible   Restart Atenolol 50 mg daily and Methimazole 5 mg daily   Make appointment with endocrinologist as soon as possible       Mucinex Dm 1 tablet twice a day for 10 days for cough and mucous

## 2019-04-16 LAB
BACTERIA SPEC CULT: NORMAL
SPECIMEN SOURCE: NORMAL

## 2019-04-23 ENCOUNTER — OFFICE VISIT (OUTPATIENT)
Dept: FAMILY MEDICINE | Facility: CLINIC | Age: 44
End: 2019-04-23
Payer: COMMERCIAL

## 2019-04-23 VITALS
BODY MASS INDEX: 28.4 KG/M2 | OXYGEN SATURATION: 97 % | TEMPERATURE: 97.7 F | SYSTOLIC BLOOD PRESSURE: 125 MMHG | HEART RATE: 73 BPM | WEIGHT: 172 LBS | DIASTOLIC BLOOD PRESSURE: 79 MMHG

## 2019-04-23 DIAGNOSIS — E05.00 GRAVES DISEASE: ICD-10-CM

## 2019-04-23 DIAGNOSIS — R51.9 LEFT TEMPORAL HEADACHE: Primary | ICD-10-CM

## 2019-04-23 PROCEDURE — 99214 OFFICE O/P EST MOD 30 MIN: CPT | Performed by: PREVENTIVE MEDICINE

## 2019-04-23 ASSESSMENT — PAIN SCALES - GENERAL: PAINLEVEL: EXTREME PAIN (8)
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
## 2019-04-23 NOTE — LETTER
April 23, 2019      Brandon Macias  [address] [place] [address]        To Whom It May Concern:    Brandon Macias  was seen on 4/23/19.  Please excuse him until 4/24/19 due to illness.        Sincerely,        Grace Guerrero MD MPH

## 2019-04-24 ENCOUNTER — ANCILLARY PROCEDURE (OUTPATIENT)
Dept: CT IMAGING | Facility: CLINIC | Age: 44
End: 2019-04-24
Attending: PREVENTIVE MEDICINE
Payer: COMMERCIAL

## 2019-04-24 ENCOUNTER — TRANSFERRED RECORDS (OUTPATIENT)
Dept: HEALTH INFORMATION MANAGEMENT | Facility: CLINIC | Age: 44
End: 2019-04-24

## 2019-04-24 DIAGNOSIS — E05.00 GRAVES DISEASE: ICD-10-CM

## 2019-04-24 DIAGNOSIS — R51.9 LEFT TEMPORAL HEADACHE: ICD-10-CM

## 2019-04-24 LAB
CREAT SERPL-MCNC: 0.64 MG/DL (ref 0.7–1.3)
GFR SERPL CREATININE-BSD FRML MDRD: >60 ML/MIN/1.73M2
GLUCOSE SERPL-MCNC: 145 MG/DL (ref 74–106)
POTASSIUM SERPL-SCNC: 4 MMOL/L (ref 3.5–5.1)
RADIOLOGIST FLAGS: ABNORMAL

## 2019-04-24 PROCEDURE — 70450 CT HEAD/BRAIN W/O DYE: CPT | Performed by: RADIOLOGY

## 2019-04-30 ENCOUNTER — ANCILLARY PROCEDURE (OUTPATIENT)
Dept: CARDIOLOGY | Facility: CLINIC | Age: 44
End: 2019-04-30
Attending: PHYSICIAN ASSISTANT
Payer: COMMERCIAL

## 2019-04-30 DIAGNOSIS — E05.00 GRAVES DISEASE: ICD-10-CM

## 2019-04-30 DIAGNOSIS — R01.1 UNDIAGNOSED CARDIAC MURMURS: ICD-10-CM

## 2019-04-30 PROCEDURE — 93306 TTE W/DOPPLER COMPLETE: CPT | Performed by: INTERNAL MEDICINE

## 2019-05-01 ENCOUNTER — OFFICE VISIT (OUTPATIENT)
Dept: FAMILY MEDICINE | Facility: CLINIC | Age: 44
End: 2019-05-01
Payer: COMMERCIAL

## 2019-05-01 VITALS
DIASTOLIC BLOOD PRESSURE: 73 MMHG | BODY MASS INDEX: 28.32 KG/M2 | HEART RATE: 56 BPM | OXYGEN SATURATION: 97 % | SYSTOLIC BLOOD PRESSURE: 112 MMHG | HEIGHT: 65 IN | WEIGHT: 170 LBS | RESPIRATION RATE: 16 BRPM | TEMPERATURE: 98.1 F

## 2019-05-01 DIAGNOSIS — Q21.11 OSTIUM SECUNDUM TYPE ATRIAL SEPTAL DEFECT: ICD-10-CM

## 2019-05-01 DIAGNOSIS — I63.332 CEREBROVASCULAR ACCIDENT (CVA) DUE TO THROMBOSIS OF LEFT POSTERIOR CEREBRAL ARTERY (H): Primary | ICD-10-CM

## 2019-05-01 DIAGNOSIS — D68.59 ANTITHROMBIN DEFICIENCY (H): ICD-10-CM

## 2019-05-01 PROCEDURE — 99213 OFFICE O/P EST LOW 20 MIN: CPT | Performed by: FAMILY MEDICINE

## 2019-05-01 RX ORDER — ATORVASTATIN CALCIUM 20 MG/1
20 TABLET, FILM COATED ORAL AT BEDTIME
Qty: 90 TABLET | Refills: 3 | Status: SHIPPED | OUTPATIENT
Start: 2019-05-01 | End: 2021-09-16

## 2019-05-01 RX ORDER — ASPIRIN 81 MG/1
81 TABLET, CHEWABLE ORAL DAILY
COMMUNITY
Start: 2019-04-25 | End: 2021-09-16

## 2019-05-01 RX ORDER — ATORVASTATIN CALCIUM 20 MG/1
20 TABLET, FILM COATED ORAL AT BEDTIME
COMMUNITY
Start: 2019-04-25 | End: 2019-05-01

## 2019-05-01 ASSESSMENT — MIFFLIN-ST. JEOR: SCORE: 1586.95

## 2019-05-01 ASSESSMENT — PAIN SCALES - GENERAL: PAINLEVEL: NO PAIN (0)

## 2019-05-01 NOTE — PATIENT INSTRUCTIONS
At [facility], we strive to deliver an exceptional experience to you, every time we see you.  If you receive a survey in the mail, please send us back your thoughts. We really do value your feedback.    Based on your medical history, these are the current health maintenance/preventive care services that you are due for (some may have been done at this visit.)  Health Maintenance Due   Topic Date Due     HIV SCREEN (SYSTEM ASSIGNED)  08/12/1991     PREVENTIVE CARE VISIT  11/18/2017         Suggested websites for health information:  Www.[employer] Prevention.org : Up to date and easily searchable information on multiple topics.  Www.[employer].gov : medication info, interactive tutorials, watch real surgeries online  Www.familydoctor.org : good info from the Academy of Family Physicians  Www.cdc.gov : public health info, travel advisories, epidemics (H1N1)  Www.aap.org : children's health info, normal development, vaccinations  Www.health.[facility].[place].us : [place] dept of health, public health issues in [place], N1N1    Your care team:                            Family Medicine Internal Medicine   MD Clemente De MD Shantel Branch-Fleming, MD Katya Georgiev PA-C Nam Ho, MD Pediatrics   CHARITY Britt, MD Kanchan Emerson CNP, MD Deborah Mielke, MD Kim Thein, APRN CNP      Clinic hours: Monday - Thursday 7 am-7 pm; Fridays 7 am-5 pm.   Urgent care: Monday - Friday 11 am-9 pm; Saturday and Sunday 9 am-5 pm.  Pharmacy : Monday -Thursday 8 am-8 pm; Friday 8 am-6 pm; Saturday and Sunday 9 am-5 pm.     Clinic: [phone]   Pharmacy: [phone]

## 2019-05-01 NOTE — PROGRESS NOTES
SUBJECTIVE:   Brandon Macias is a 43 year old male who presents to clinic today for the following   health issues:      Hospital Follow-up Visit:    Hospital/Nursing Home/IP Rehab Facility: [facility]  Date of Admission: 4/24/19  Date of Discharge: 4/25/19  Reason(s) for Admission: Stroke            Problems taking medications regularly:  None       Medication changes since discharge: None       Problems adhering to non-medication therapy:  None    Summary of hospitalization:  CareEverywhere information obtained and reviewed  Diagnostic Tests/Treatments reviewed.  Follow up needed: none  Other Healthcare Providers Involved in Patient s Care:         None  Update since discharge: improved.     Post Discharge Medication Reconciliation: discharge medications reconciled, continue medications without change.  Plan of care communicated with patient     Coding guidelines for this visit:  Type of Medical   Decision Making Face-to-Face Visit       within 7 Days of discharge Face-to-Face Visit        within 14 days of discharge   Moderate Complexity [id number]   High Complexity [id number]              Additional history: as documented    Reviewed  and updated as needed this visit by clinical staff  Tobacco  Allergies  Meds  Med Hx  Surg Hx  Fam Hx  Soc Hx        Reviewed and updated as needed this visit by Provider         Patient Active Problem List   Diagnosis     CARDIOVASCULAR SCREENING; LDL GOAL LESS THAN 160     Obesity, Class I, BMI 30-34.9     Positive FIT (fecal immunochemical test)     Graves disease     Cerebrovascular accident (CVA) due to thrombosis of left posterior cerebral artery (H)     Ostium secundum type atrial septal defect     Past Surgical History:   Procedure Laterality Date     NO HISTORY OF SURGERY         Social History     Tobacco Use     Smoking status: Never Smoker     Smokeless tobacco: Never Used   Substance Use Topics     Alcohol use: Yes     Family History   Problem Relation Age of Onset      "Family History Negative Other            ROS:  Constitutional, HEENT, cardiovascular, pulmonary, GI, , musculoskeletal, neuro, skin, endocrine and psych systems are negative, except as otherwise noted.    OBJECTIVE:     /73 (BP Location: Left arm, Patient Position: Chair, Cuff Size: Adult Large)   Pulse 56   Temp 98.1  F (36.7  C) (Oral)   Resp 16   Ht 1.657 m (5' 5.25\")   Wt 77.1 kg (170 lb)   SpO2 97%   BMI 28.07 kg/m    Body mass index is 28.07 kg/m .  GENERAL: healthy, alert and no distress  NECK: no adenopathy, no asymmetry, masses, or scars and thyroid normal to palpation  RESP: lungs clear to auscultation - no rales, rhonchi or wheezes  CV: regular rate and rhythm, normal S1 S2, no S3 or S4, no murmur, click or rub, no peripheral edema and peripheral pulses strong  ABDOMEN: soft, nontender, no hepatosplenomegaly, no masses and bowel sounds normal  MS: no gross musculoskeletal defects noted, no edema  NEURO: Normal strength and tone, mentation intact and speech normal    Diagnostic Test Results:  none     ASSESSMENT/PLAN:     1. Cerebrovascular accident (CVA) due to thrombosis of left posterior cerebral artery (H)  Following up with Neurology. Continue with aspirin and atorvastatin.  - atorvastatin (LIPITOR) 20 MG tablet; Take 1 tablet (20 mg) by mouth At Bedtime  Dispense: 90 tablet; Refill: 3    2. Ostium secundum type atrial septal defect    - CARDIO  ADULT REFERRAL    3. Antithrombin deficiency (H)    - ONC/HEME ADULT REFERRAL    See Patient Instructions    Jack Horton MD, MD  [facility]      "

## 2019-05-02 NOTE — TELEPHONE ENCOUNTER
ONCOLOGY INTAKE: Records Information      APPT INFORMATION: 06/10 jamila/Lalo  Referring provider:   Jack Horton MD  Referring provider s clinic:  Dami Canchola/patt/irene  Reason for visit/diagnosis:  Antithrombin deficiency (H    RECORDS INFORMATION:  Were the records received with the referral (via [employer])? In epic    Has patient been seen for any external appt for this diagnosis? no    If yes, where? na    Has patient had any imaging or procedures outside of [facility] for this condition? no      If Yes, where? na    ADDITIONAL INFORMATION:  Dx:Antithrombin deficiency (H) [D68.59]: caller intake: ref by  Jack Horton MD:Dami Canchola/patt/irene

## 2019-05-07 NOTE — TELEPHONE ENCOUNTER
RECORDS STATUS - ALL OTHER DIAGNOSIS      RECORDS RECEIVED FROM: [facility]   DATE RECEIVED: 5/7/19   NOTES STATUS DETAILS   OFFICE NOTE from referring provider 5/1/19: Dr. Horton [facility]   OFFICE NOTE from medical oncologist     DISCHARGE SUMMARY from hospital 4/24/19 [facility]   DISCHARGE REPORT from the ER     OPERATIVE REPORT     MEDICATION LIST As of 5/1/19 [facility]   CLINICAL TRIAL TREATMENTS TO DATE     LABS     PATHOLOGY REPORTS     ANYTHING RELATED TO DIAGNOSIS 4/25/19 /Epic   GENONOMIC TESTING     TYPE:     IMAGING (NEED IMAGES & REPORT)     CT SCANS     MRI     MAMMO     ULTRASOUND     PET

## 2019-06-03 ENCOUNTER — TELEPHONE (OUTPATIENT)
Dept: CARDIOLOGY | Facility: CLINIC | Age: 44
End: 2019-06-03

## 2019-06-03 NOTE — TELEPHONE ENCOUNTER
Patient would like to hold off on his appointment for now and will call back if interested in rescheduling.

## 2019-06-10 ENCOUNTER — PRE VISIT (OUTPATIENT)
Dept: ONCOLOGY | Facility: CLINIC | Age: 44
End: 2019-06-10

## 2019-07-22 ENCOUNTER — TELEPHONE (OUTPATIENT)
Dept: FAMILY MEDICINE | Facility: CLINIC | Age: 44
End: 2019-07-22

## 2019-07-22 DIAGNOSIS — E05.00 GRAVES DISEASE: ICD-10-CM

## 2019-07-22 NOTE — TELEPHONE ENCOUNTER
Reason for Call:  Medication or medication refill:    Do you use a [facility]?  Name of the pharmacy and phone number for the current request:  [facility] [id number] [place] [address] [address] [facility]    Name of the medication requested: Does not remebre the name for the thyroid medication.    Other request: Please call when approved.    Can we leave a detailed message on this number? YES    Phone number patient can be reached at: Cell number on file:    Telephone Information:   Mobile [phone]     Best Time: any    Call taken on 7/22/2019 at 2:03 PM by Brittny Saul

## 2019-07-23 RX ORDER — METHIMAZOLE 5 MG/1
5 TABLET ORAL DAILY
Qty: 90 TABLET | Refills: 3 | Status: SHIPPED | OUTPATIENT
Start: 2019-07-23 | End: 2019-12-17

## 2019-07-23 NOTE — TELEPHONE ENCOUNTER
This writer attempted to contact Brandon on 07/23/19    Reason for call Rx refill request approved and left detailed message.    When patient calls back, please contact 1st floor Arabella Riley. routine priority.        Kareem Ruiz

## 2019-07-30 ENCOUNTER — OFFICE VISIT (OUTPATIENT)
Dept: FAMILY MEDICINE | Facility: CLINIC | Age: 44
End: 2019-07-30
Payer: COMMERCIAL

## 2019-07-30 VITALS
HEIGHT: 65 IN | HEART RATE: 57 BPM | WEIGHT: 180.6 LBS | DIASTOLIC BLOOD PRESSURE: 81 MMHG | BODY MASS INDEX: 30.09 KG/M2 | TEMPERATURE: 97.7 F | RESPIRATION RATE: 18 BRPM | SYSTOLIC BLOOD PRESSURE: 123 MMHG | OXYGEN SATURATION: 95 %

## 2019-07-30 DIAGNOSIS — I63.332 CEREBROVASCULAR ACCIDENT (CVA) DUE TO THROMBOSIS OF LEFT POSTERIOR CEREBRAL ARTERY (H): ICD-10-CM

## 2019-07-30 DIAGNOSIS — E05.00 GRAVES DISEASE: ICD-10-CM

## 2019-07-30 DIAGNOSIS — Z01.818 PREOP GENERAL PHYSICAL EXAM: Primary | ICD-10-CM

## 2019-07-30 DIAGNOSIS — Q21.11 OSTIUM SECUNDUM TYPE ATRIAL SEPTAL DEFECT: ICD-10-CM

## 2019-07-30 LAB
CREAT SERPL-MCNC: 0.77 MG/DL (ref 0.66–1.25)
ERYTHROCYTE [DISTWIDTH] IN BLOOD BY AUTOMATED COUNT: 12.8 % (ref 10–15)
GFR SERPL CREATININE-BSD FRML MDRD: >90 ML/MIN/{1.73_M2}
GLUCOSE SERPL-MCNC: 95 MG/DL (ref 70–99)
HCT VFR BLD AUTO: 49 % (ref 40–53)
HGB BLD-MCNC: 17.2 G/DL (ref 13.3–17.7)
MCH RBC QN AUTO: 31.8 PG (ref 26.5–33)
MCHC RBC AUTO-ENTMCNC: 35.1 G/DL (ref 31.5–36.5)
MCV RBC AUTO: 91 FL (ref 78–100)
PLATELET # BLD AUTO: 164 10E9/L (ref 150–450)
POTASSIUM SERPL-SCNC: 3.8 MMOL/L (ref 3.4–5.3)
RBC # BLD AUTO: 5.41 10E12/L (ref 4.4–5.9)
T4 FREE SERPL-MCNC: 1.96 NG/DL (ref 0.76–1.46)
TSH SERPL DL<=0.005 MIU/L-ACNC: <0.01 MU/L (ref 0.4–4)
WBC # BLD AUTO: 8.1 10E9/L (ref 4–11)

## 2019-07-30 PROCEDURE — 84443 ASSAY THYROID STIM HORMONE: CPT | Performed by: PHYSICIAN ASSISTANT

## 2019-07-30 PROCEDURE — 36415 COLL VENOUS BLD VENIPUNCTURE: CPT | Performed by: PHYSICIAN ASSISTANT

## 2019-07-30 PROCEDURE — 85027 COMPLETE CBC AUTOMATED: CPT | Performed by: PHYSICIAN ASSISTANT

## 2019-07-30 PROCEDURE — 84132 ASSAY OF SERUM POTASSIUM: CPT | Performed by: PHYSICIAN ASSISTANT

## 2019-07-30 PROCEDURE — 82947 ASSAY GLUCOSE BLOOD QUANT: CPT | Performed by: PHYSICIAN ASSISTANT

## 2019-07-30 PROCEDURE — 84439 ASSAY OF FREE THYROXINE: CPT | Performed by: PHYSICIAN ASSISTANT

## 2019-07-30 PROCEDURE — 99214 OFFICE O/P EST MOD 30 MIN: CPT | Performed by: PHYSICIAN ASSISTANT

## 2019-07-30 PROCEDURE — 82565 ASSAY OF CREATININE: CPT | Performed by: PHYSICIAN ASSISTANT

## 2019-07-30 ASSESSMENT — MIFFLIN-ST. JEOR: SCORE: 1635.04

## 2019-07-30 ASSESSMENT — PAIN SCALES - GENERAL: PAINLEVEL: NO PAIN (0)

## 2019-07-30 NOTE — PROGRESS NOTES
[address] [address]  [phone]  Dept: [phone]    PRE-OP EVALUATION:  Today's date: 2019    Brandon Macias (: 1975) presents for pre-operative evaluation assessment as requested by Dr. Henriquez.  He requires evaluation and anesthesia risk assessment prior to undergoing surgery/procedure for treatment of ASD    Proposed Surgery/ Procedure: repair of ASD  Date of Surgery/ Procedure: 2019  Time of Surgery/ Procedure: 12:30pm  Hospital/Surgical Facility: [facility]    Fax number for surgical facility: [phone]  Primary Physician: Jack Horton  Type of Anesthesia Anticipated: General    Patient has a Health Care Directive or Living Will:  NO    1. YES - Do you have a history of heart attack, stroke, stent, bypass or surgery on an artery in the head, neck, heart or legs? Patient CVA on 19 due to moderate ASD  2. NO - Do you ever have any pain or discomfort in your chest?  3. NO - Do you have a history of  Heart Failure?  4. NO - Are you troubled by shortness of breath when: walking on the level, up a slight hill or at night?  5. NO - Do you currently have a cold, bronchitis or other respiratory infection?  6. NO - Do you have a cough, shortness of breath or wheezing?  7. NO - Do you sometimes get pains in the calves of your legs when you walk?  8. NO - Do you or anyone in your family have previous history of blood clots?  9. NO - Do you or does anyone in your family have a serious bleeding problem such as prolonged bleeding following surgeries or cuts?  10. NO - Have you ever had problems with anemia or been told to take iron pills?  11. NO - Have you had any abnormal blood loss such as black, tarry or bloody stools, or abnormal vaginal bleeding?  12. NO - Have you ever had a blood transfusion?  13. NO - Have you or any of your relatives ever had problems with anesthesia?  14. NO - Do you have sleep apnea,  excessive snoring or daytime drowsiness?  15. NO - Do you have any prosthetic heart valves?  16. NO - Do you have prosthetic joints?  17. NO - Is there any chance that you may be pregnant?      HPI:     HPI related to upcoming procedure: patient has moderate in size ASD that caused CVA on 4/23/19    See problem list for active medical problems.  Problems all longstanding and stable, except as noted/documented.  See ROS for pertinent symptoms related to these conditions.      MEDICAL HISTORY:     Patient Active Problem List    Diagnosis Date Noted     Cerebrovascular accident (CVA) due to thrombosis of left posterior cerebral artery (H) 05/01/2019     Priority: Medium     Ostium secundum type atrial septal defect 05/01/2019     Priority: Medium     Graves disease 11/29/2016     Priority: Medium     Positive FIT (fecal immunochemical test) 11/28/2016     Priority: Medium     Obesity, Class I, BMI 30-34.9 03/02/2016     Priority: Medium     CARDIOVASCULAR SCREENING; LDL GOAL LESS THAN 160 02/27/2012     Priority: Medium      History reviewed. No pertinent past medical history.  Past Surgical History:   Procedure Laterality Date     NO HISTORY OF SURGERY       Current Outpatient Medications   Medication Sig Dispense Refill     aspirin (ASA) 81 MG chewable tablet Take 81 mg by mouth daily       atenolol (TENORMIN) 50 MG tablet Take 1 tablet (50 mg) by mouth daily 90 tablet 3     atorvastatin (LIPITOR) 20 MG tablet Take 1 tablet (20 mg) by mouth At Bedtime 90 tablet 3     Dextromethorphan-Guaifenesin  MG TB12 Take 1 tablet by mouth 2 times daily 28 tablet 0     diclofenac (VOLTAREN) 50 MG EC tablet Take 1 tablet (50 mg) by mouth 3 times daily as needed for moderate pain 30 tablet 0     methimazole (TAPAZOLE) 5 MG tablet Take 1 tablet (5 mg) by mouth daily 90 tablet 3     OTC products: None, except as noted above    Allergies   Allergen Reactions     No Known Drug Allergies       Latex Allergy: NO    Social History  "    Tobacco Use     Smoking status: Never Smoker     Smokeless tobacco: Never Used   Substance Use Topics     Alcohol use: Yes     History   Drug Use No       REVIEW OF SYSTEMS:   Constitutional, neuro, ENT, endocrine, pulmonary, cardiac, gastrointestinal, genitourinary, musculoskeletal, integument and psychiatric systems are negative, except as otherwise noted.    EXAM:   /81 (BP Location: Right arm, Patient Position: Sitting, Cuff Size: Adult Large)   Pulse 57   Temp 97.7  F (36.5  C) (Oral)   Resp 18   Ht 1.657 m (5' 5.25\")   Wt 81.9 kg (180 lb 9.6 oz)   SpO2 95%   BMI 29.82 kg/m      GENERAL APPEARANCE: healthy, alert and no distress     EYES: EOMI,  PERRL     HENT: ear canals and TM's normal and nose and mouth without ulcers or lesions     NECK: no adenopathy, no asymmetry, masses, or scars and thyroid normal to palpation     RESP: lungs clear to auscultation - no rales, rhonchi or wheezes     CV: regular rates and rhythm, normal S1 S2, no S3 or S4 , no murmur     ABDOMEN:  soft, nontender, no HSM or masses and bowel sounds normal     MS: extremities normal- no gross deformities noted, no evidence of inflammation in joints, FROM in all extremities.     SKIN: no suspicious lesions or rashes     NEURO: Normal strength and tone, sensory exam grossly normal, mentation intact and speech normal     PSYCH: mentation appears normal. and affect normal/bright     LYMPHATICS: No cervical adenopathy    DIAGNOSTICS:   EK19- NSR, no acute STwave changes     Labs Resulted Today:   Results for orders placed or performed in visit on 19   CBC with platelets   Result Value Ref Range    WBC 8.1 4.0 - 11.0 10e9/L    RBC Count 5.41 4.4 - 5.9 10e12/L    Hemoglobin 17.2 13.3 - 17.7 g/dL    Hematocrit 49.0 40.0 - 53.0 %    MCV 91 78 - 100 fl    MCH 31.8 26.5 - 33.0 pg    MCHC 35.1 31.5 - 36.5 g/dL    RDW 12.8 10.0 - 15.0 %    Platelet Count 164 150 - 450 10e9/L     Labs Drawn and in Process:   Unresulted Labs " Ordered in the Past 30 Days of this Admission     Date and Time Order Name Status Description    7/30/2019 1310 GLUCOSE In process     7/30/2019 1303 CREATININE In process     7/30/2019 1303 POTASSIUM In process     7/30/2019 1303 T4 FREE In process     7/30/2019 1303 TSH In process           Recent Labs   Lab Test 04/24/19 04/15/19  1139 02/17/17  1105 11/18/16  0747   HGB  --  16.2 16.7 15.2   PLT  --  159 180 186   NA  --  141  --  139   POTASSIUM 4.0 3.8  --  3.7   CR 0.64* 0.66  --  0.62*   A1C  --   --   --  5.1        IMPRESSION:   Reason for surgery/procedure: ASD repair    The proposed surgical procedure is considered INTERMEDIATE risk.    REVISED CARDIAC RISK INDEX  The patient has the following serious cardiovascular risks for perioperative complications such as (MI, PE, VFib and 3  AV Block):  No serious cardiac risks  INTERPRETATION: 1 risks: Class II (low risk - 0.9% complication rate)    The patient has the following additional risks for perioperative complications:  No identified additional risks      ICD-10-CM    1. Preop general physical exam Z01.818 CBC with platelets     TSH     T4, free     Potassium     Creatinine     Glucose   2. Ostium secundum type atrial septal defect Q21.1    3. Graves disease E05.00    4. Cerebrovascular accident (CVA) due to thrombosis of left posterior cerebral artery (H) I63.332        RECOMMENDATIONS:     --Consult hospital rounder / IM to assist post-op medical management  Patient is currently off Aspirin, Xarelto and Diclofenac- stopped on his own, haven't started Xarelto yet.  Tylenol is ok to take for pain as needed   --Patient is to hold all medications on the day of surgery. Restart medications after the surgery. Bring all medications with you to surgery.  --NPO for 8 hours before surgery    APPROVAL GIVEN to proceed with proposed procedure, without further diagnostic evaluation  Patient is cleared for surgery as low risk for anesthesia. Full Code.       Signed  Electronically by: Charity Lawton PA-C  Reviewed and agree with assessment and plan above. Jack Horton MD    Copy of this evaluation report is provided to requesting physician.    [place] Preop Guidelines    Revised Cardiac Risk Index

## 2019-07-31 ENCOUNTER — TELEPHONE (OUTPATIENT)
Dept: FAMILY MEDICINE | Facility: CLINIC | Age: 44
End: 2019-07-31

## 2019-07-31 NOTE — TELEPHONE ENCOUNTER
Reason for Call:  Other     Detailed comments: asking for pre-op for surgery today at 10:45am today, please fax to [phone]    Phone Number Patient can be reached at: Other phone number:  [phone]    Best Time: any    Can we leave a detailed message on this number? YES    Call taken on 7/31/2019 at 9:23 AM by Nora Magallon

## 2019-08-22 NOTE — RESULT ENCOUNTER NOTE
Please call the patient with these results:      Please follow up with endocrinology to address hyperthyroid. This was advised and discussed many time in office visit  Kimberly Lawton PAC

## 2019-08-23 ENCOUNTER — TELEPHONE (OUTPATIENT)
Dept: FAMILY MEDICINE | Facility: CLINIC | Age: 44
End: 2019-08-23

## 2019-08-23 NOTE — TELEPHONE ENCOUNTER
Notes recorded by Charity Lawton PA-C on 8/22/2019 at 3:50 PM CDT  Please call the patient with these results:      Please follow up with endocrinology to address hyperthyroid. This was advised and discussed many time in office visit  Kimberly KAPLAN

## 2019-08-23 NOTE — TELEPHONE ENCOUNTER
Patient contacted and informed of the below per provider documentation. Patient verbalizes understanding.     Patient states he is driving and cannot write down the phone number to schedule with Endocrinology. Patient will call back to get phone number.     If patient calls back:     Please give him the number to schedule with Endocrinology. (see below)    Your provider has referred you to: [facility]: [facility] [phone]   [email or web address]        Anisha Vera RN

## 2019-12-16 DIAGNOSIS — E05.00 GRAVES DISEASE: ICD-10-CM

## 2019-12-16 NOTE — TELEPHONE ENCOUNTER
"Requested Prescriptions   Pending Prescriptions Disp Refills     methimazole (TAPAZOLE) 5 MG tablet  Last Written Prescription Date:  7/23/19  Last Fill Quantity: 90,  # refills: 3   Last Office Visit with [facility] or [facility] prescribing provider:  7/30/19   Future Office Visit:      90 tablet 3     Sig: Take 1 tablet (5 mg) by mouth daily       Anti-Thyroid Agents Protocol Failed - 12/16/2019  4:45 PM        Failed - Refills for this medication group require provider approval        Failed - Normal TSH in past 12 months     Recent Labs   Lab Test 07/30/19  1310   TSH <0.01*              Passed - CBC is on file within the past 12 months     Recent Labs   Lab Test 07/30/19  1310   WBC 8.1   RBC 5.41   HGB 17.2   HCT 49.0                    Passed - Recent (12 mo) or future (30 days) visit within the authorizing provider's specialty     Patient has had an office visit with the authorizing provider or a provider within the authorizing providers department within the previous 12 mos or has a future within next 30 days. See \"Patient Info\" tab in inbasket, or \"Choose Columns\" in Meds & Orders section of the refill encounter.              Passed - Medication is active on the patient's med list        Passed - Patient is age 18 or older          "

## 2019-12-17 RX ORDER — METHIMAZOLE 5 MG/1
5 TABLET ORAL DAILY
Qty: 90 TABLET | Refills: 1 | Status: SHIPPED | OUTPATIENT
Start: 2019-12-17 | End: 2021-09-16

## 2019-12-17 NOTE — TELEPHONE ENCOUNTER
Remaining refills sent to patient's requested pharmacy.   Anisha Vera RN  [facility]

## 2019-12-18 ENCOUNTER — OFFICE VISIT (OUTPATIENT)
Dept: FAMILY MEDICINE | Facility: CLINIC | Age: 44
End: 2019-12-18
Payer: COMMERCIAL

## 2019-12-18 VITALS
HEART RATE: 63 BPM | SYSTOLIC BLOOD PRESSURE: 133 MMHG | DIASTOLIC BLOOD PRESSURE: 83 MMHG | OXYGEN SATURATION: 95 % | WEIGHT: 178 LBS | HEIGHT: 65 IN | TEMPERATURE: 98 F | BODY MASS INDEX: 29.66 KG/M2

## 2019-12-18 DIAGNOSIS — E66.811 OBESITY, CLASS I, BMI 30-34.9: ICD-10-CM

## 2019-12-18 DIAGNOSIS — J01.90 ACUTE NON-RECURRENT SINUSITIS, UNSPECIFIED LOCATION: Primary | ICD-10-CM

## 2019-12-18 DIAGNOSIS — I63.332 CEREBROVASCULAR ACCIDENT (CVA) DUE TO THROMBOSIS OF LEFT POSTERIOR CEREBRAL ARTERY (H): ICD-10-CM

## 2019-12-18 DIAGNOSIS — Z23 NEED FOR PROPHYLACTIC VACCINATION AND INOCULATION AGAINST INFLUENZA: ICD-10-CM

## 2019-12-18 DIAGNOSIS — Q21.11 OSTIUM SECUNDUM TYPE ATRIAL SEPTAL DEFECT: ICD-10-CM

## 2019-12-18 PROCEDURE — 99214 OFFICE O/P EST MOD 30 MIN: CPT | Mod: 25 | Performed by: NURSE PRACTITIONER

## 2019-12-18 PROCEDURE — 90471 IMMUNIZATION ADMIN: CPT | Performed by: NURSE PRACTITIONER

## 2019-12-18 PROCEDURE — 90686 IIV4 VACC NO PRSV 0.5 ML IM: CPT | Performed by: NURSE PRACTITIONER

## 2019-12-18 ASSESSMENT — MIFFLIN-ST. JEOR: SCORE: 1606.34

## 2019-12-18 ASSESSMENT — PAIN SCALES - GENERAL: PAINLEVEL: NO PAIN (0)

## 2019-12-18 NOTE — PROGRESS NOTES
.Subjective     Brandon Macias is a 44 year old male who presents to clinic today for the following health issues:    HPI   Acute Illness   Acute illness concerns: cough, sick for a week, coughing up blood and when blows nose has blood  Onset: 1 week    Fever: YES- subjective    Chills/Sweats: YES-sweaty    Headache (location?): YES    Sinus Pressure:YESfromtal and maxillary    Conjunctivitis:  no    Ear Pain: no    Rhinorrhea: no     Congestion: YES    Sore Throat: YES     Cough: YES    Wheeze: no     Decreased Appetite: no     Nausea: no     Vomiting: no     Diarrhea:  no     Dysuria/Freq.: no     Fatigue/Achiness: no     Sick/Strep Exposure: YES- family     Therapies Tried and outcome: tylenol and ibuprofen with no relief   Patient started with URI symptoms 1 week ago but symptoms worsened significantly 5 days ago.  He has not worked since and has, in fact, lost his job.    Patient also has history of PAF vinod found to have ostium secundum ASD with enlarged right ventricle. He had transcatheter closure of the ASD on 7/31/19 and was started on Toprol XL 50 and Lipitor 20  Mg daily and was continued on ASA 81mg daily given he had history of large left cerebral CVA 4/24/19 (now fully recovered).  He is not taking any of his medications presently.    Patient Active Problem List   Diagnosis     CARDIOVASCULAR SCREENING; LDL GOAL LESS THAN 160     Obesity, Class I, BMI 30-34.9     Positive FIT (fecal immunochemical test)     Graves disease     Cerebrovascular accident (CVA) due to thrombosis of left posterior cerebral artery (H)     Ostium secundum type atrial septal defect     Past Surgical History:   Procedure Laterality Date     NO HISTORY OF SURGERY         Social History     Tobacco Use     Smoking status: Never Smoker     Smokeless tobacco: Never Used   Substance Use Topics     Alcohol use: Yes     Family History   Problem Relation Age of Onset     Family History Negative Other          Current Outpatient Medications  "  Medication Sig Dispense Refill     amoxicillin-clavulanate (AUGMENTIN) 875-125 MG tablet Take 1 tablet by mouth 2 times daily for 10 days 20 tablet 0     aspirin (ASA) 81 MG chewable tablet Take 81 mg by mouth daily       atenolol (TENORMIN) 50 MG tablet Take 1 tablet (50 mg) by mouth daily 90 tablet 3     atorvastatin (LIPITOR) 20 MG tablet Take 1 tablet (20 mg) by mouth At Bedtime 90 tablet 3     Dextromethorphan-Guaifenesin  MG TB12 Take 1 tablet by mouth 2 times daily (Patient not taking: Reported on 12/18/2019) 28 tablet 0     diclofenac (VOLTAREN) 50 MG EC tablet Take 1 tablet (50 mg) by mouth 3 times daily as needed for moderate pain (Patient not taking: Reported on 12/18/2019) 30 tablet 0     methimazole (TAPAZOLE) 5 MG tablet Take 1 tablet (5 mg) by mouth daily (Patient not taking: Reported on 12/18/2019) 90 tablet 1     BP Readings from Last 3 Encounters:   12/18/19 133/83   07/30/19 123/81   05/01/19 112/73    Wt Readings from Last 3 Encounters:   12/18/19 80.7 kg (178 lb)   07/30/19 81.9 kg (180 lb 9.6 oz)   05/01/19 77.1 kg (170 lb)         Reviewed and updated as needed this visit by Provider         Review of Systems   ROS COMP: Constitutional, HEENT, cardiovascular, pulmonary, gi and gu systems are negative, except as otherwise noted.      Objective    /83 (BP Location: Left arm, Patient Position: Chair, Cuff Size: Adult Regular)   Pulse 63   Temp 98  F (36.7  C) (Oral)   Ht 1.638 m (5' 4.5\")   Wt 80.7 kg (178 lb)   SpO2 95%   BMI 30.08 kg/m    Body mass index is 30.08 kg/m .  Physical Exam   GENERAL: healthy, alert and no distress  EYES: Eyes grossly normal to inspection, PERRL and conjunctivae and sclerae normal  HENT: ear canals and TM's normal, nose and mouth without ulcers or lesions, nasal  Mucosa is rythematous, edematous with thick green, blood tinged discharge, no significant sinus TTP  NECK: no adenopathy, no asymmetry, masses, or scars and thyroid normal to " "palpation  RESP: lungs clear to auscultation - no rales, rhonchi or wheezes  CV: regular rate and rhythm, normal S1 S2, no S3 or S4, no murmur, click or rub, no peripheral edema and peripheral pulses strong  ABDOMEN: soft, nontender, no hepatosplenomegaly, no masses and bowel sounds normal  MS: no gross musculoskeletal defects noted, no edema  SKIN: no suspicious lesions or rashes  NEURO: Normal strength and tone, mentation intact and speech normal  PSYCH: mentation appears normal, affect normal/bright  LYMPH: normal ant/post cervical, supraclavicular nodes    Diagnostic Test Results:  Labs reviewed in Epic  none         Assessment & Plan     1. Acute non-recurrent sinusitis, unspecified location  Will give him antibiotic but asked him to wait until he's had symptoms for a full 10 days before starting it.  He is to use nasal saline, humidified air, stay well hydrated in the meantimje.  - amoxicillin-clavulanate (AUGMENTIN) 875-125 MG tablet; Take 1 tablet by mouth 2 times daily for 10 days  Dispense: 20 tablet; Refill: 0    2. Cerebrovascular accident (CVA) due to thrombosis of left posterior cerebral artery (H)  Advised him to get back on the ASA 81 mg daily and Lipitor for borderline high lipids.    3. Ostium secundum type atrial septal defect  Get back on Toprol, follow with Cardiology 8/2020, sooner if new/worsening symptoms.    4. Obesity, Class I, BMI 30-34.9  Benefits of weight loss reviewed in detail, encouraged him to cut back on the carbohydrates in the diet, consume more fruits and vegetables, drink plenty of water, avoid fruit juices, sodas, get 150 min moderate exercise/week.  Recheck weight in 6 months.        5. Need for prophylactic vaccination and inoculation against influenza         BMI:   Estimated body mass index is 30.08 kg/m  as calculated from the following:    Height as of this encounter: 1.638 m (5' 4.5\").    Weight as of this encounter: 80.7 kg (178 lb).   Weight management plan: Discussed " healthy diet and exercise guidelines        Work on weight loss  Regular exercise  See Patient Instructions    No follow-ups on file.    LILLY Danielle [facility]

## 2019-12-18 NOTE — PATIENT INSTRUCTIONS
At [facility], we strive to deliver an exceptional experience to you, every time we see you.  If you receive a survey in the mail, please send us back your thoughts. We really do value your feedback.    Based on your medical history, these are the current health maintenance/preventive care services that you are due for (some may have been done at this visit.)  Health Maintenance Due   Topic Date Due     HIV SCREENING  08/12/1988     PREVENTIVE CARE VISIT  11/18/2017     INFLUENZA VACCINE (1) 09/01/2019         Suggested websites for health information:  Www.[employer] : Up to date and easily searchable information on multiple topics.  Www.[employer].gov : medication info, interactive tutorials, watch real surgeries online  Www.familydoctor.org : good info from the Academy of Family Physicians  Www.cdc.gov : public health info, travel advisories, epidemics (H1N1)  Www.aap.org : children's health info, normal development, vaccinations  Www.health.[facility].[place].us : [place] dept of health, public health issues in [place], N1N1    Your care team:                            Family Medicine Internal Medicine   MD Clemente De MD Shantel Branch-Fleming, MD Katya Georgiev PA-C Nam Ho, MD Pediatrics   CHARITY Britt, CHA CARR CNP   MD Kanchan Swanson MD Deborah Mielke, MD Kim Thein, LILLY [facility]      Clinic hours: Monday - Thursday 7 am-7 pm; Fridays 7 am-5 pm.   Urgent care: Monday - Friday 11 am-9 pm; Saturday and Sunday 9 am-5 pm.  Pharmacy : Monday -Thursday 8 am-8 pm; Friday 8 am-6 pm; Saturday and Sunday 9 am-5 pm.     Clinic: [phone]   Pharmacy: [phone]    Patient Education     Sinusitis (Antibiotic Treatment)    The sinuses are air-filled spaces within the bones of the face. They connect to the inside of the nose. Sinusitis is an inflammation of the tissue that lines the sinuses. Sinusitis can occur during a cold. It  can also happen due to allergies to pollens and other particles in the air. Sinusitis can cause symptoms of sinus congestion and a feeling of fullness. A sinus infection causes fever, headache, and facial pain. There is often green or yellow fluid draining from the nose or into the back of the throat (post-nasal drip). You have been given antibiotics to treat this condition.  Home care    Take the full course of antibiotics as instructed. Do not stop taking them, even when you feel better.    Drink plenty of water, hot tea, and other liquids. This may help thin nasal mucus. It also may help your sinuses drain fluids.    Heat may help soothe painful areas of your face. Use a towel soaked in hot water. Or,  the shower and direct the warm spray onto your face. Using a vaporizer along with a menthol rub at night may also help soothe symptoms.     An expectorant with guaifenesin may help thin nasal mucus and help your sinuses drain fluids.    You can use an over-the-counter decongestant, unless a similar medicine was prescribed to you. Nasal sprays work the fastest. Use one that contains phenylephrine or oxymetazoline. First blow your nose gently. Then use the spray. Do not use these medicines more often than directed on the label. If you do, your symptoms may get worse. You may also take pills that contain pseudoephedrine. Don t use products that combine multiple medicines. This is because side effects may be increased. Read labels. You can also ask the pharmacist for help. (People with high blood pressure should not use decongestants. They can raise blood pressure.)    Over-the-counter antihistamines may help if allergies contributed to your sinusitis.      Do not use nasal rinses or irrigation during an acute sinus infection, unless your healthcare provider tells you to. Rinsing may spread the infection to other areas in your sinuses.    Use acetaminophen or ibuprofen to control pain, unless another pain  medicine was prescribed to you. If you have chronic liver or kidney disease or ever had a stomach ulcer, talk with your healthcare provider before using these medicines. (Aspirin should never be taken by anyone under age 18 who is ill with a fever. It may cause severe liver damage.)    Don't smoke. This can make symptoms worse.  Follow-up care  Follow up with your healthcare provider or our staff if you are not better in 1 week.  When to seek medical advice  Call your healthcare provider if any of these occur:    Facial pain or headache that gets worse    Stiff neck    Unusual drowsiness or confusion    Swelling of your forehead or eyelids    Vision problems, such as blurred or double vision    Fever of 100.4 F (38 C) or higher, or as directed by your healthcare provider    Seizure    Breathing problems    Symptoms don't go away in 10 days  Prevention  Here are steps you can take to help prevent an infection:    Keep good hand washing habits.    Don t have close contact with people who have sore throats, colds, or other upper respiratory infections.    Don t smoke, and stay away from secondhand smoke.    Stay up to date with of your vaccines.  Date Last Reviewed: 11/1/2017 2000-2018 The elmeme.me. [address] [place] [address], [place] [address]. All rights reserved. This information is not intended as a substitute for professional medical care. Always follow your healthcare professional's instructions.

## 2021-09-16 ENCOUNTER — OFFICE VISIT (OUTPATIENT)
Dept: FAMILY MEDICINE | Facility: CLINIC | Age: 46
End: 2021-09-16
Payer: MEDICAID

## 2021-09-16 VITALS
RESPIRATION RATE: 18 BRPM | HEIGHT: 65 IN | WEIGHT: 179 LBS | OXYGEN SATURATION: 96 % | SYSTOLIC BLOOD PRESSURE: 127 MMHG | HEART RATE: 64 BPM | TEMPERATURE: 97.1 F | BODY MASS INDEX: 29.82 KG/M2 | DIASTOLIC BLOOD PRESSURE: 78 MMHG

## 2021-09-16 DIAGNOSIS — R29.90 STROKE-LIKE EPISODE: ICD-10-CM

## 2021-09-16 DIAGNOSIS — I63.332 CEREBROVASCULAR ACCIDENT (CVA) DUE TO THROMBOSIS OF LEFT POSTERIOR CEREBRAL ARTERY (H): ICD-10-CM

## 2021-09-16 DIAGNOSIS — Q21.11 OSTIUM SECUNDUM TYPE ATRIAL SEPTAL DEFECT: ICD-10-CM

## 2021-09-16 DIAGNOSIS — Z09 HOSPITAL DISCHARGE FOLLOW-UP: Primary | ICD-10-CM

## 2021-09-16 DIAGNOSIS — E05.00 GRAVES DISEASE: ICD-10-CM

## 2021-09-16 DIAGNOSIS — Z13.6 CARDIOVASCULAR SCREENING; LDL GOAL LESS THAN 160: ICD-10-CM

## 2021-09-16 DIAGNOSIS — I48.0 PAROXYSMAL A-FIB (H): ICD-10-CM

## 2021-09-16 PROBLEM — E05.90 HYPERTHYROIDISM: Status: ACTIVE | Noted: 2019-04-24

## 2021-09-16 PROBLEM — I63.9 STROKE (H): Status: ACTIVE | Noted: 2019-04-24

## 2021-09-16 LAB
ALBUMIN SERPL-MCNC: 3.6 G/DL (ref 3.4–5)
ALP SERPL-CCNC: 94 U/L (ref 40–150)
ALT SERPL W P-5'-P-CCNC: 37 U/L (ref 0–70)
ANION GAP SERPL CALCULATED.3IONS-SCNC: 5 MMOL/L (ref 3–14)
AST SERPL W P-5'-P-CCNC: 25 U/L (ref 0–45)
BILIRUB SERPL-MCNC: 1.8 MG/DL (ref 0.2–1.3)
BUN SERPL-MCNC: 14 MG/DL (ref 7–30)
CALCIUM SERPL-MCNC: 8.6 MG/DL (ref 8.5–10.1)
CHLORIDE BLD-SCNC: 108 MMOL/L (ref 94–109)
CHOLEST SERPL-MCNC: 126 MG/DL
CO2 SERPL-SCNC: 26 MMOL/L (ref 20–32)
CREAT SERPL-MCNC: 0.6 MG/DL (ref 0.66–1.25)
ERYTHROCYTE [DISTWIDTH] IN BLOOD BY AUTOMATED COUNT: 12.4 % (ref 10–15)
FASTING STATUS PATIENT QL REPORTED: NO
GFR SERPL CREATININE-BSD FRML MDRD: >90 ML/MIN/1.73M2
GLUCOSE BLD-MCNC: 99 MG/DL (ref 70–99)
HCT VFR BLD AUTO: 46.6 % (ref 40–53)
HDLC SERPL-MCNC: 39 MG/DL
HGB BLD-MCNC: 16 G/DL (ref 13.3–17.7)
LDLC SERPL CALC-MCNC: 71 MG/DL
MCH RBC QN AUTO: 29.7 PG (ref 26.5–33)
MCHC RBC AUTO-ENTMCNC: 34.3 G/DL (ref 31.5–36.5)
MCV RBC AUTO: 87 FL (ref 78–100)
NONHDLC SERPL-MCNC: 87 MG/DL
PLATELET # BLD AUTO: 162 10E3/UL (ref 150–450)
POTASSIUM BLD-SCNC: 3.6 MMOL/L (ref 3.4–5.3)
PROT SERPL-MCNC: 6.8 G/DL (ref 6.8–8.8)
RBC # BLD AUTO: 5.38 10E6/UL (ref 4.4–5.9)
SODIUM SERPL-SCNC: 139 MMOL/L (ref 133–144)
T4 FREE SERPL-MCNC: 2.62 NG/DL (ref 0.76–1.46)
TRIGL SERPL-MCNC: 82 MG/DL
TSH SERPL DL<=0.005 MIU/L-ACNC: <0.01 MU/L (ref 0.4–4)
WBC # BLD AUTO: 6.5 10E3/UL (ref 4–11)

## 2021-09-16 PROCEDURE — 80061 LIPID PANEL: CPT | Performed by: NURSE PRACTITIONER

## 2021-09-16 PROCEDURE — 99214 OFFICE O/P EST MOD 30 MIN: CPT | Performed by: NURSE PRACTITIONER

## 2021-09-16 PROCEDURE — 84443 ASSAY THYROID STIM HORMONE: CPT | Performed by: NURSE PRACTITIONER

## 2021-09-16 PROCEDURE — 36415 COLL VENOUS BLD VENIPUNCTURE: CPT | Performed by: NURSE PRACTITIONER

## 2021-09-16 PROCEDURE — 84439 ASSAY OF FREE THYROXINE: CPT | Performed by: NURSE PRACTITIONER

## 2021-09-16 PROCEDURE — 80053 COMPREHEN METABOLIC PANEL: CPT | Performed by: NURSE PRACTITIONER

## 2021-09-16 PROCEDURE — 85027 COMPLETE CBC AUTOMATED: CPT | Performed by: NURSE PRACTITIONER

## 2021-09-16 ASSESSMENT — PAIN SCALES - GENERAL: PAINLEVEL: NO PAIN (0)

## 2021-09-16 ASSESSMENT — MIFFLIN-ST. JEOR: SCORE: 1600.88

## 2021-09-16 NOTE — PATIENT INSTRUCTIONS
At [facility], we strive to deliver an exceptional experience to you, every time we see you. If you receive a survey, please complete it as we do value your feedback.  If you have MyChart, you can expect to receive results automatically within 24 hours of their completion.  Your provider will send a note interpreting your results as well.   If you do not have MyChart, you should receive your results in about a week by mail.    Your care team:                            Family Medicine Internal Medicine   MD Clemente De MD Shantel Branch-Fleming, MD Srinivasa Vaka, MD Katya Belousova, PAJACKIE Zarate, APRN CNP    Jack Horton, MD Pediatrics   Lebron Reveles, PAJACKIE Block, CNP MD Luana Bills APRN CNP   MD Kanchan Swanson MD Deborah Mielke, MD Kristine Gonzalez, APRN [facility]      Clinic hours: Monday - Thursday 7 am-6 pm; Fridays 7 am-5 pm.   Urgent care: Monday - Friday 10 am- 8 pm; Saturday and Sunday 9 am-5 pm.    Clinic: [phone]       [facility]: Monday - Thursday 8 am - 7 pm; Friday 8 am - 6 pm  [facility]: [phone]     Use www.oncare.org for 24/7 diagnosis and treatment of dozens of conditions.

## 2021-09-16 NOTE — PROGRESS NOTES
"    Assessment & Plan     Hospital discharge follow-up  emergency department visit on 8/27    Stroke-like episode  CT was negative for stroke. It did show \"encephalomalacia r/t remote posterior left cerebral infarct\"  - Comprehensive metabolic panel (BMP + Alb, Alk Phos, ALT, AST, Total. Bili, TP); Future  - CBC with platelets; Future  - Comprehensive metabolic panel (BMP + Alb, Alk Phos, ALT, AST, Total. Bili, TP)  - CBC with platelets    Ostium secundum type atrial septal defect  Due for follow up.  - Adult Cardiology Eval Referral; Future    Graves disease  Needs recheck. Previously on methimazole.   - TSH with free T4 reflex; Future  - TSH with free T4 reflex    Cerebrovascular accident (CVA) due to thrombosis of left posterior cerebral artery (H)  Refilled Xarelto. Discussed importance of taking medication as prescribed.   - Adult Cardiology Eval Referral; Future  - Lipid panel reflex to direct LDL Non-fasting; Future  - rivaroxaban ANTICOAGULANT (XARELTO) 20 MG TABS tablet; Take 1 tablet (20 mg) by mouth daily (with dinner)  - Lipid panel reflex to direct LDL Non-fasting    Paroxysmal A-fib (H)  Due for follow up with cardiology.   - Adult Cardiology Eval Referral; Future  - rivaroxaban ANTICOAGULANT (XARELTO) 20 MG TABS tablet; Take 1 tablet (20 mg) by mouth daily (with dinner)    CARDIOVASCULAR SCREENING; LDL GOAL LESS THAN 160  - Lipid panel reflex to direct LDL Non-fasting; Future  - Lipid panel reflex to direct LDL Non-fasting         BMI:   Estimated body mass index is 30.25 kg/m  as calculated from the following:    Height as of this encounter: 1.638 m (5' 4.5\").    Weight as of this encounter: 81.2 kg (179 lb).   Weight management plan: Discussed healthy diet and exercise guidelines    See Patient Instructions    Return in about 3 months (around 12/16/2021).     The benefits, risks and potential side effects were discussed in detail. Black box warnings discussed as relevant. All patient questions were " "answered. The patient was instructed to follow up immediately if any adverse reactions develop.    Return precautions discussed, including when to seek urgent/emergent care.    Patient verbalizes understanding and agrees with plan of care. Patient stable for discharge.      LILLY Mustafa CNP [facility] DEWEY Taylor is a 46 year old who presents for the following health issues   HPI       Pleasant 46 year old male presents for emergency department follow up for stroke-like symptoms. Per emergency department note:      Brnadon Macias is a 46 y.o. male with a history of stroke who presents to the emergency department for evaluation of stroke symptoms. Around 1630 the patient was walking to his kitchen when he had the sudden onset of right arm paresthesia/shaking, and garbled/slurred speech. His family member also noted the patient having a right sided facial droop. Symptoms resolved after one minute. Patient complains of recent palpitations. He denies a headache, blurred vision, diplopia, nausea or diaphoresis. Of note, the patient had a stroke in 2019.        Didn't have insurance at the time but recently got MA  Stroke in 2019. Was off Xarelto for about 1 year. Back on xarelto since emergency department visit.  States he was supposed to follow up with cardiology regarding his heart surgery but he didn't have insurance again until now  No episodes since discharge  No chest pain, shortness of breath, orthopnea, dizziness, palpitations, or lower extremity edema.      Review of Systems   Constitutional, HEENT, cardiovascular, pulmonary, GI, , musculoskeletal, neuro, skin, endocrine and psych systems are negative, except as otherwise noted.      Objective    /78 (BP Location: Left arm, Patient Position: Sitting, Cuff Size: Adult Regular)   Pulse 64   Temp 97.1  F (36.2  C)   Resp 18   Ht 1.638 m (5' 4.5\")   Wt 81.2 kg (179 lb)   SpO2 96%   BMI 30.25 kg/m    Body mass " index is 30.25 kg/m .  Physical Exam   GENERAL: healthy, alert and no distress  NECK: no adenopathy, no asymmetry, masses, or scars and thyroid normal to palpation  RESP: lungs clear to auscultation - no rales, rhonchi or wheezes  CV: regular rate and rhythm, normal S1 S2, no S3 or S4, no murmur, click or rub, no peripheral edema and peripheral pulses strong  ABDOMEN: soft, nontender, no hepatosplenomegaly, no masses and bowel sounds normal  MS: no gross musculoskeletal defects noted, no edema  PSYCH: mentation appears normal, affect normal/bright    Results for orders placed or performed in visit on 09/16/21 (from the past 24 hour(s))   CBC with platelets   Result Value Ref Range    WBC Count 6.5 4.0 - 11.0 10e3/uL    RBC Count 5.38 4.40 - 5.90 10e6/uL    Hemoglobin 16.0 13.3 - 17.7 g/dL    Hematocrit 46.6 40.0 - 53.0 %    MCV 87 78 - 100 fL    MCH 29.7 26.5 - 33.0 pg    MCHC 34.3 31.5 - 36.5 g/dL    RDW 12.4 10.0 - 15.0 %    Platelet Count 162 150 - 450 10e3/uL

## 2021-09-17 ENCOUNTER — TELEPHONE (OUTPATIENT)
Dept: FAMILY MEDICINE | Facility: CLINIC | Age: 46
End: 2021-09-17

## 2021-09-17 DIAGNOSIS — E05.90 HYPERTHYROIDISM: Primary | ICD-10-CM

## 2021-09-20 NOTE — TELEPHONE ENCOUNTER
This writer attempted to contact patient on 09/20/21      Reason for call results and unable to leave message.       If patient calls back:   Registered Nurse called. Follow Triage Call workflow        Jesusita Allan RN

## 2021-09-21 NOTE — TELEPHONE ENCOUNTER
This writer attempted to contact Tong on 09/21/21      Reason for call Results note and left message to give a return phone call to the nurse at [phone].      If patient calls back:   Relay message from provider, (read verbatim), document that pt called and close encounter      Rach Jain RN

## 2021-09-21 NOTE — TELEPHONE ENCOUNTER
Patient called back regarding lab results.   Nurse relayed providers message verbatim.   Provided patient with the number to schedule an appointment with endocrinology.   Patient had clear understanding of the provider's instructions and verbalized he will call and schedule an appointment.     Lea Rodas RN  [facility]

## 2021-11-03 DIAGNOSIS — I48.0 PAROXYSMAL A-FIB (H): ICD-10-CM

## 2021-11-03 DIAGNOSIS — I63.332 CEREBROVASCULAR ACCIDENT (CVA) DUE TO THROMBOSIS OF LEFT POSTERIOR CEREBRAL ARTERY (H): ICD-10-CM

## 2021-11-04 NOTE — TELEPHONE ENCOUNTER
Routing refill request to provider for review/approval because:  Requested Prescriptions   Pending Prescriptions Disp Refills    rivaroxaban ANTICOAGULANT (XARELTO) 20 MG TABS tablet 30 tablet 1     Sig: Take 1 tablet (20 mg) by mouth daily (with dinner)        Direct Oral Anticoagulant Agents Failed - 11/3/2021  5:11 PM        Failed - Creatinine Clearance greater than 50 ml/min on file in past 3 mos     No lab results found.          Passed - Normal Platelets on file in past 12 months       Recent Labs   Lab Test 09/16/21  1104                  Passed - Medication is active on med list        Passed - Serum creatinine less than or equal to 1.4 on file in past 3 mos     Recent Labs   Lab Test 09/16/21  1104   CR 0.60*       Ok to refill medication if creatinine is low          Passed - Patient is 18 years of age or older        Passed - Recent (6 mo) or future (30 days) visit within the authorizing provider's specialty                Vera RECINOSN, RN

## 2021-11-23 NOTE — PATIENT INSTRUCTIONS
Preventive Health Recommendations  Male Ages 40 to 49    Yearly exam:             See your health care provider every year in order to  o   Review health changes.   o   Discuss preventive care.    o   Review your medicines if your doctor has prescribed any.    You should be tested each year for STDs (sexually transmitted diseases) if you re at risk.     Have a cholesterol test every 5 years.     Have a colonoscopy (test for colon cancer) if someone in your family has had colon cancer or polyps before age 50.     After age 45, have a diabetes test (fasting glucose). If you are at risk for diabetes, you should have this test every 3 years.      Talk with your health care provider about whether or not a prostate cancer screening test (PSA) is right for you.    Shots: Get a flu shot each year. Get a tetanus shot every 10 years.     Nutrition:    Eat at least 5 servings of fruits and vegetables daily.     Eat whole-grain bread, whole-wheat pasta and brown rice instead of white grains and rice.     Get adequate Calcium and Vitamin D.     Lifestyle    Exercise for at least 150 minutes a week (30 minutes a day, 5 days a week). This will help you control your weight and prevent disease.     Limit alcohol to one drink per day.     No smoking.     Wear sunscreen to prevent skin cancer.     See your dentist every six months for an exam and cleaning.    At [facility], we strive to deliver an exceptional experience to you, every time we see you. If you receive a survey, please complete it as we do value your feedback.  If you have [employer], you can expect to receive results automatically within 24 hours of their completion.  Your provider will send a note interpreting your results as well.   If you do not have MyChart, you should receive your results in about a week by mail.    Your care team:                            Family Medicine Internal Medicine   MD Clemente De MD    MD Simba Ramos MD Katya Belousova, PAJACKIE Zarate, APRN CNP    Jack Horton, MD Pediatrics   Lebron Reveles, PAJACKIE Block, MD Luana Whitehead APRN CNP   MD Kanchan Swanson MD Deborah Mielke, MD Kristine Gonzalez, APRN [facility]      Clinic hours: Monday - Thursday 7 am-6 pm; Fridays 7 am-5 pm.   Urgent care: Monday - Friday 10 am- 8 pm; Saturday and Sunday 9 am-5 pm.    Clinic: [phone]       [facility]: Monday - Thursday 8 am - 7 pm; Friday 8 am - 6 pm  [facility]: [phone]     Use www.oncare.org for 24/7 diagnosis and treatment of dozens of conditions.

## 2021-11-23 NOTE — PROGRESS NOTES
SUBJECTIVE:   CC: Brandon Macias is an 46 year old male who presents for preventative health visit.       Patient has been advised of split billing requirements and indicates understanding: Yes  HPI      Today's PHQ-2 Score:   PHQ-2 ( 1999 Pfizer) 9/16/2021   Q1: Little interest or pleasure in doing things 2   Q2: Feeling down, depressed or hopeless 0   PHQ-2 Score 2   PHQ-2 Total Score (12-17 Years)- Positive if 3 or more points; Administer PHQ-A if positive 2   Q1: Little interest or pleasure in doing things More than half the days   Q2: Feeling down, depressed or hopeless Not at all   PHQ-2 Score 2       Abuse: Current or Past(Physical, Sexual or Emotional)- No  Do you feel safe in your environment? Yes    Have you ever done Advance Care Planning? (For example, a Health Directive, POLST, or a discussion with a medical provider or your loved ones about your wishes): No, advance care planning information given to patient to review.  Patient plans to discuss their wishes with loved ones or provider.      Social History     Tobacco Use     Smoking status: Never Smoker     Smokeless tobacco: Never Used   Substance Use Topics     Alcohol use: Yes     If you drink alcohol do you typically have >3 drinks per day or >7 drinks per week? No    Alcohol Use 11/18/2016   Prescreen: >3 drinks/day or >7 drinks/week? The patient does not drink >3 drinks per day nor >7 drinks per week.   No flowsheet data found.    Last PSA: No results found for: PSA    Reviewed orders with patient. Reviewed health maintenance and updated orders accordingly - Yes  Lab work is in process  Labs reviewed in EPIC  BP Readings from Last 3 Encounters:   11/30/21 127/84   09/16/21 127/78   12/18/19 133/83    Wt Readings from Last 3 Encounters:   11/30/21 79.3 kg (174 lb 12.8 oz)   09/16/21 81.2 kg (179 lb)   12/18/19 80.7 kg (178 lb)                  Patient Active Problem List   Diagnosis     CARDIOVASCULAR SCREENING; LDL GOAL LESS THAN 160     Obesity,  "Class I, BMI 30-34.9     Positive FIT (fecal immunochemical test)     Graves disease     Cerebrovascular accident (CVA) due to thrombosis of left posterior cerebral artery (H)     Ostium secundum type atrial septal defect     Acute appendicitis with localized peritonitis     Hyperthyroidism     Stroke (H)     Past Surgical History:   Procedure Laterality Date     NO HISTORY OF SURGERY         Social History     Tobacco Use     Smoking status: Never Smoker     Smokeless tobacco: Never Used   Substance Use Topics     Alcohol use: Yes     Family History   Problem Relation Age of Onset     Family History Negative Other          Current Outpatient Medications   Medication Sig Dispense Refill     rivaroxaban ANTICOAGULANT (XARELTO) 20 MG TABS tablet Take 1 tablet (20 mg) by mouth daily (with dinner) 90 tablet 3     Allergies   Allergen Reactions     No Known Drug Allergies        Reviewed and updated as needed this visit by clinical staff                Reviewed and updated as needed this visit by Provider                   Review of Systems  CONSTITUTIONAL: NEGATIVE for fever, chills, change in weight  INTEGUMENTARY/SKIN: NEGATIVE for worrisome rashes, moles or lesions  EYES: NEGATIVE for vision changes or irritation  ENT: NEGATIVE for ear, mouth and throat problems  RESP: NEGATIVE for significant cough or SOB  CV: NEGATIVE for chest pain, palpitations or peripheral edema  GI: NEGATIVE for nausea, abdominal pain, heartburn, or change in bowel habits   male: negative for dysuria, hematuria, decreased urinary stream, erectile dysfunction, urethral discharge  MUSCULOSKELETAL: NEGATIVE for significant arthralgias or myalgia  NEURO: NEGATIVE for weakness, dizziness or paresthesias  PSYCHIATRIC: NEGATIVE for changes in mood or affect    OBJECTIVE:   /84 (BP Location: Left arm, Patient Position: Sitting, Cuff Size: Adult Regular)   Pulse 65   Temp 97.2  F (36.2  C) (Tympanic)   Ht 1.632 m (5' 4.25\")   Wt 79.3 kg " (174 lb 12.8 oz)   SpO2 99%   BMI 29.77 kg/m      Physical Exam  GENERAL: healthy, alert and no distress  NECK: no adenopathy, no asymmetry, masses, or scars and thyroid normal to palpation  RESP: lungs clear to auscultation - no rales, rhonchi or wheezes  CV: regular rate and rhythm, normal S1 S2, no S3 or S4, no murmur, click or rub, no peripheral edema and peripheral pulses strong  ABDOMEN: soft, nontender, no hepatosplenomegaly, no masses and bowel sounds normal  MS: no gross musculoskeletal defects noted, no edema    Diagnostic Test Results:  Labs reviewed in Epic    ASSESSMENT/PLAN:   (Z00.00) Routine general medical examination at a health care facility  (primary encounter diagnosis)  Comment:   Plan: as below.    (E05.00) Graves disease  Comment:   Plan: TSH with free T4 reflex        Recheck. Patient missed Endocrinology appointment. Rescheduled in 2/2022. May restart methimazole if needed.    (I48.0) Paroxysmal A-fib (H)  Comment:   Plan: rivaroxaban ANTICOAGULANT (XARELTO) 20 MG TABS         tablet        Continue with Xarelto.    (Q21.1) Ostium secundum type atrial septal defect  Comment:   Plan: f/u with Cardiology.    (I63.332) Cerebrovascular accident (CVA) due to thrombosis of left posterior cerebral artery (H)  Comment:   Plan: rivaroxaban ANTICOAGULANT (XARELTO) 20 MG TABS         tablet            (Z13.6) CARDIOVASCULAR SCREENING; LDL GOAL LESS THAN 100  Comment:   Plan: Comprehensive metabolic panel (BMP + Alb, Alk         Phos, ALT, AST, Total. Bili, TP), Lipid panel         reflex to direct LDL Fasting            (Z13.1) Screening for diabetes mellitus  Comment:   Plan: Comprehensive metabolic panel (BMP + Alb, Alk         Phos, ALT, AST, Total. Bili, TP)            (Z11.4) Screening for HIV (human immunodeficiency virus)  Comment:   Plan: HIV Antigen Antibody Combo            (Z11.59) Need for hepatitis C screening test  Comment:   Plan: Hepatitis C Screen Reflex to HCV RNA Quant and          "Genotype            (Z23) Encounter for immunization  Comment:   Plan: COVID-19,PF,PFIZER (12+ Yrs PURPLE LABEL)        Booster given.      Patient has been advised of split billing requirements and indicates understanding: Yes  COUNSELING:   Reviewed preventive health counseling, as reflected in patient instructions       Regular exercise       Healthy diet/nutrition       Vision screening    Estimated body mass index is 30.25 kg/m  as calculated from the following:    Height as of 9/16/21: 1.638 m (5' 4.5\").    Weight as of 9/16/21: 81.2 kg (179 lb).         He reports that he has never smoked. He has never used smokeless tobacco.      Counseling Resources:  ATP IV Guidelines  Pooled Cohorts Equation Calculator  FRAX Risk Assessment  ICSI Preventive Guidelines  Dietary Guidelines for Americans, 2010  USDA's MyPlate  ASA Prophylaxis  Lung CA Screening    Jack Horton MD, MD  [facility]  "

## 2021-11-30 ENCOUNTER — OFFICE VISIT (OUTPATIENT)
Dept: FAMILY MEDICINE | Facility: CLINIC | Age: 46
End: 2021-11-30
Payer: COMMERCIAL

## 2021-11-30 VITALS
WEIGHT: 174.8 LBS | BODY MASS INDEX: 29.84 KG/M2 | SYSTOLIC BLOOD PRESSURE: 127 MMHG | OXYGEN SATURATION: 99 % | HEIGHT: 64 IN | HEART RATE: 65 BPM | DIASTOLIC BLOOD PRESSURE: 84 MMHG | TEMPERATURE: 97.2 F

## 2021-11-30 DIAGNOSIS — Z13.1 SCREENING FOR DIABETES MELLITUS: ICD-10-CM

## 2021-11-30 DIAGNOSIS — Z11.4 SCREENING FOR HIV (HUMAN IMMUNODEFICIENCY VIRUS): ICD-10-CM

## 2021-11-30 DIAGNOSIS — Z00.00 ROUTINE GENERAL MEDICAL EXAMINATION AT A HEALTH CARE FACILITY: Primary | ICD-10-CM

## 2021-11-30 DIAGNOSIS — I48.0 PAROXYSMAL A-FIB (H): ICD-10-CM

## 2021-11-30 DIAGNOSIS — E05.00 GRAVES DISEASE: ICD-10-CM

## 2021-11-30 DIAGNOSIS — I63.332 CEREBROVASCULAR ACCIDENT (CVA) DUE TO THROMBOSIS OF LEFT POSTERIOR CEREBRAL ARTERY (H): ICD-10-CM

## 2021-11-30 DIAGNOSIS — Z11.59 NEED FOR HEPATITIS C SCREENING TEST: ICD-10-CM

## 2021-11-30 DIAGNOSIS — Q21.11 OSTIUM SECUNDUM TYPE ATRIAL SEPTAL DEFECT: ICD-10-CM

## 2021-11-30 DIAGNOSIS — Z23 ENCOUNTER FOR IMMUNIZATION: ICD-10-CM

## 2021-11-30 DIAGNOSIS — Z13.6 CARDIOVASCULAR SCREENING; LDL GOAL LESS THAN 100: ICD-10-CM

## 2021-11-30 LAB
ALBUMIN SERPL-MCNC: 3.8 G/DL (ref 3.4–5)
ALP SERPL-CCNC: 121 U/L (ref 40–150)
ALT SERPL W P-5'-P-CCNC: 48 U/L (ref 0–70)
ANION GAP SERPL CALCULATED.3IONS-SCNC: 6 MMOL/L (ref 3–14)
AST SERPL W P-5'-P-CCNC: 20 U/L (ref 0–45)
BILIRUB SERPL-MCNC: 0.9 MG/DL (ref 0.2–1.3)
BUN SERPL-MCNC: 11 MG/DL (ref 7–30)
CALCIUM SERPL-MCNC: 8.9 MG/DL (ref 8.5–10.1)
CHLORIDE BLD-SCNC: 106 MMOL/L (ref 94–109)
CHOLEST SERPL-MCNC: 141 MG/DL
CO2 SERPL-SCNC: 28 MMOL/L (ref 20–32)
CREAT SERPL-MCNC: 0.64 MG/DL (ref 0.66–1.25)
FASTING STATUS PATIENT QL REPORTED: NO
GFR SERPL CREATININE-BSD FRML MDRD: >90 ML/MIN/1.73M2
GLUCOSE BLD-MCNC: 98 MG/DL (ref 70–99)
HDLC SERPL-MCNC: 40 MG/DL
LDLC SERPL CALC-MCNC: 55 MG/DL
NONHDLC SERPL-MCNC: 101 MG/DL
POTASSIUM BLD-SCNC: 3.9 MMOL/L (ref 3.4–5.3)
PROT SERPL-MCNC: 7.6 G/DL (ref 6.8–8.8)
SODIUM SERPL-SCNC: 140 MMOL/L (ref 133–144)
T4 FREE SERPL-MCNC: 2.32 NG/DL (ref 0.76–1.46)
TRIGL SERPL-MCNC: 230 MG/DL
TSH SERPL DL<=0.005 MIU/L-ACNC: <0.01 MU/L (ref 0.4–4)

## 2021-11-30 PROCEDURE — 87389 HIV-1 AG W/HIV-1&-2 AB AG IA: CPT | Performed by: FAMILY MEDICINE

## 2021-11-30 PROCEDURE — 84439 ASSAY OF FREE THYROXINE: CPT | Performed by: FAMILY MEDICINE

## 2021-11-30 PROCEDURE — 0004A COVID-19,PF,PFIZER (12+ YRS): CPT | Performed by: FAMILY MEDICINE

## 2021-11-30 PROCEDURE — 36415 COLL VENOUS BLD VENIPUNCTURE: CPT | Performed by: FAMILY MEDICINE

## 2021-11-30 PROCEDURE — 91300 COVID-19,PF,PFIZER (12+ YRS): CPT | Performed by: FAMILY MEDICINE

## 2021-11-30 PROCEDURE — 84443 ASSAY THYROID STIM HORMONE: CPT | Performed by: FAMILY MEDICINE

## 2021-11-30 PROCEDURE — 99396 PREV VISIT EST AGE 40-64: CPT | Performed by: FAMILY MEDICINE

## 2021-11-30 PROCEDURE — 80061 LIPID PANEL: CPT | Performed by: FAMILY MEDICINE

## 2021-11-30 PROCEDURE — 86803 HEPATITIS C AB TEST: CPT | Performed by: FAMILY MEDICINE

## 2021-11-30 PROCEDURE — 80053 COMPREHEN METABOLIC PANEL: CPT | Performed by: FAMILY MEDICINE

## 2021-11-30 ASSESSMENT — ENCOUNTER SYMPTOMS
ABDOMINAL PAIN: 0
DYSURIA: 0
CONSTIPATION: 0
SORE THROAT: 0
CHILLS: 0
ARTHRALGIAS: 0
FEVER: 0
HEARTBURN: 0
WEAKNESS: 0
MYALGIAS: 0
DIARRHEA: 0
EYE PAIN: 0
PARESTHESIAS: 0
NAUSEA: 0
PALPITATIONS: 0
SHORTNESS OF BREATH: 0
JOINT SWELLING: 0
HEADACHES: 0
COUGH: 0
HEMATOCHEZIA: 0
NERVOUS/ANXIOUS: 0
DIZZINESS: 1
HEMATURIA: 0
FREQUENCY: 0

## 2021-11-30 ASSESSMENT — MIFFLIN-ST. JEOR: SCORE: 1577.86

## 2021-12-01 ENCOUNTER — TELEPHONE (OUTPATIENT)
Dept: FAMILY MEDICINE | Facility: CLINIC | Age: 46
End: 2021-12-01
Payer: COMMERCIAL

## 2021-12-01 LAB
HCV AB SERPL QL IA: NONREACTIVE
HIV 1+2 AB+HIV1 P24 AG SERPL QL IA: NONREACTIVE

## 2021-12-01 RX ORDER — METHIMAZOLE 5 MG/1
5 TABLET ORAL DAILY
Qty: 90 TABLET | Refills: 3 | Status: SHIPPED | OUTPATIENT
Start: 2021-12-01 | End: 2022-02-28

## 2021-12-01 NOTE — TELEPHONE ENCOUNTER
Writer attempted to contact patient. Patient was unavailable but a voicemail message was left to call the [facility] back at [phone] and ask to speak with a nurse.     If patient calls back please relay provider message below.    ----- Message -----  From: Jack Hortno MD  Sent: 12/1/2021  11:03 AM CST  To: [facility]    Please let patient know that his thyroid level is elevated. Recommend patient restarting methimazole 5 mg daily. This was sent to pharmacy. Patient should keep appointment with scheduled Endocrinology in 2/2022. Kindye, liver, electrolyte, blood sugar and cholesterol tests were normal.    MD Sarah Beth Mo RN, BSN  [facility]

## 2021-12-01 NOTE — TELEPHONE ENCOUNTER
Patient called back.     Writer relayed provider message below to the patient. Patient verbalized understanding and had no further questions or concerns at this time.     ----- Message -----  From: Jack Horton MD  Sent: 12/1/2021  11:03 AM CST  To: [facility]     Please let patient know that his thyroid level is elevated. Recommend patient restarting methimazole 5 mg daily. This was sent to pharmacy. Patient should keep appointment with scheduled Endocrinology in 2/2022. Kindye, liver, electrolyte, blood sugar and cholesterol tests were normal.     MD Sarah Beth Mo RN, BSN  [facility]

## 2021-12-03 ENCOUNTER — NURSE TRIAGE (OUTPATIENT)
Dept: FAMILY MEDICINE | Facility: CLINIC | Age: 46
End: 2021-12-03
Payer: COMMERCIAL

## 2021-12-03 NOTE — TELEPHONE ENCOUNTER
Patient calling and states he restarted Methimazole yesterday.  Feels he is having rapid heartbeat.  Fells hot.  -180 on his watch earlier.  Right now down to 109.  Down to 90's while on phone.  Just feels hot.  No headache, chest pain, lightheaded or dizziness.  Will go to  for evaluation.  Vanessa Rodriguez RN    Reason for Disposition    Heart beating very rapidly (e.g., > 140 / minute) and not present now (Exception: during exercise)    Additional Information    Negative: Passed out (i.e., fainted, collapsed and was not responding)    Negative: Shock suspected (e.g., cold/pale/clammy skin, too weak to stand, low BP, rapid pulse)    Negative: Difficult to awaken or acting confused (e.g., disoriented, slurred speech)    Negative: Visible sweat on face or sweat dripping down face    Negative: Unable to walk, or can only walk with assistance (e.g., requires support)    Negative: Received SHOCK from implantable cardiac defibrillator and has persisting symptoms (i.e., palpitations, lightheadedness)    Negative: Dizziness, lightheadedness, or weakness and heart beating very rapidly (e.g., > 140 / minute)    Negative: Dizziness, lightheadedness, or weakness and heart beating very slowly (e.g., < 50 / minute)    Negative: Sounds like a life-threatening emergency to the triager    Negative: Chest pain    Negative: Difficulty breathing    Negative: Dizziness, lightheadedness, or weakness    Negative: Heart beating very rapidly (e.g., > 140 / minute) and present now (Exception: during exercise)    Negative: Heart beating very slowly (e.g., < 50 / minute) (Exception: athlete)    Negative: New or worsened shortness of breath with activity (dyspnea on exertion)    Negative: Patient sounds very sick or weak to the triager    Negative: Wearing a 'Holter monitor' or 'cardiac event monitor'    Negative: Received SHOCK from implantable cardiac defibrillator (and now feels well)    Answer Assessment - Initial Assessment  "Questions  1. DESCRIPTION: \"Please describe your heart rate or heart beat that you are having\" (e.g., fast/slow, regular/irregular, skipped or extra beats, \"palpitations\")      Feels fast and pounding  2. ONSET: \"When did it start?\" (Minutes, hours or days)       This morning  3. DURATION: \"How long does it last\" (e.g., seconds, minutes, hours)      unsure  4. PATTERN \"Does it come and go, or has it been constant since it started?\"  \"Does it get worse with exertion?\"   \"Are you feeling it now?\"      Feels hot  5. TAP: \"Using your hand, can you tap out what you are feeling on a chair or table in front of you, so that I can hear?\" (Note: not all patients can do this)        n/a  6. HEART RATE: \"Can you tell me your heart rate?\" \"How many beats in 15 seconds?\"  (Note: not all patients can do this)        See note  7. RECURRENT SYMPTOM: \"Have you ever had this before?\" If so, ask: \"When was the last time?\" and \"What happened that time?\"       In past possibly  8. CAUSE: \"What do you think is causing the palpitations?\"      Methimazole  9. CARDIAC HISTORY: \"Do you have any history of heart disease?\" (e.g., heart attack, angina, bypass surgery, angioplasty, arrhythmia)       Angioplasty?  10. OTHER SYMPTOMS: \"Do you have any other symptoms?\" (e.g., dizziness, chest pain, sweating, difficulty breathing)        Sweating and feels hot  11. PREGNANCY: \"Is there any chance you are pregnant?\" \"When was your last menstrual period?\"        n/a    Protocols used: HEART RATE AND HEARTBEAT [id number]      "

## 2022-01-13 DIAGNOSIS — E05.00 GRAVES DISEASE: Primary | ICD-10-CM

## 2022-01-13 RX ORDER — METOPROLOL TARTRATE 25 MG/1
25 TABLET, FILM COATED ORAL 2 TIMES DAILY
Qty: 180 TABLET | Refills: 0 | Status: SHIPPED | OUTPATIENT
Start: 2022-01-13 | End: 2022-04-12

## 2022-01-13 RX ORDER — METOPROLOL TARTRATE 25 MG/1
TABLET, FILM COATED ORAL
COMMUNITY
Start: 2021-12-03 | End: 2022-01-13

## 2022-01-13 NOTE — TELEPHONE ENCOUNTER
New pharmacy for pateint requesting a med not found on any med lists?    Metoprolol 25mg tabs    Fax says last filled 12/03/21?

## 2022-01-13 NOTE — TELEPHONE ENCOUNTER
Routing refill request to provider for review/approval because:  Medication is reported/historical  Zoila Lema BSN, RN

## 2022-02-15 ENCOUNTER — OFFICE VISIT (OUTPATIENT)
Dept: ENDOCRINOLOGY | Facility: CLINIC | Age: 47
End: 2022-02-15
Attending: NURSE PRACTITIONER
Payer: COMMERCIAL

## 2022-02-15 ENCOUNTER — ANCILLARY PROCEDURE (OUTPATIENT)
Dept: ULTRASOUND IMAGING | Facility: CLINIC | Age: 47
End: 2022-02-15
Attending: INTERNAL MEDICINE
Payer: COMMERCIAL

## 2022-02-15 VITALS
BODY MASS INDEX: 32.02 KG/M2 | SYSTOLIC BLOOD PRESSURE: 150 MMHG | HEART RATE: 50 BPM | WEIGHT: 188 LBS | OXYGEN SATURATION: 99 % | DIASTOLIC BLOOD PRESSURE: 82 MMHG

## 2022-02-15 DIAGNOSIS — E05.00 GRAVES DISEASE: Primary | ICD-10-CM

## 2022-02-15 DIAGNOSIS — E05.90 HYPERTHYROIDISM: ICD-10-CM

## 2022-02-15 DIAGNOSIS — E05.00 GRAVES DISEASE: ICD-10-CM

## 2022-02-15 LAB
T3 SERPL-MCNC: 134 NG/DL (ref 60–181)
T4 FREE SERPL-MCNC: 1.25 NG/DL (ref 0.76–1.46)
TSH SERPL DL<=0.005 MIU/L-ACNC: <0.01 MU/L (ref 0.4–4)

## 2022-02-15 PROCEDURE — 99244 OFF/OP CNSLTJ NEW/EST MOD 40: CPT | Performed by: INTERNAL MEDICINE

## 2022-02-15 PROCEDURE — 99000 SPECIMEN HANDLING OFFICE-LAB: CPT | Performed by: INTERNAL MEDICINE

## 2022-02-15 PROCEDURE — 84445 ASSAY OF TSI GLOBULIN: CPT | Mod: 90 | Performed by: INTERNAL MEDICINE

## 2022-02-15 PROCEDURE — 76536 US EXAM OF HEAD AND NECK: CPT | Performed by: RADIOLOGY

## 2022-02-15 PROCEDURE — 84480 ASSAY TRIIODOTHYRONINE (T3): CPT | Performed by: INTERNAL MEDICINE

## 2022-02-15 PROCEDURE — 84439 ASSAY OF FREE THYROXINE: CPT | Performed by: INTERNAL MEDICINE

## 2022-02-15 PROCEDURE — 84443 ASSAY THYROID STIM HORMONE: CPT | Performed by: INTERNAL MEDICINE

## 2022-02-15 PROCEDURE — 36415 COLL VENOUS BLD VENIPUNCTURE: CPT | Performed by: INTERNAL MEDICINE

## 2022-02-15 NOTE — NURSING NOTE
Brandon Macias's goals for this visit include:   Chief Complaint   Patient presents with     New Patient     Thyroid Problem       He requests these members of his care team be copied on today's visit information: yes     PCP: Jack Horton    Referring Provider:  LILLY Ya CNP  [address] [place] [address]    BP (!) 150/82 (BP Location: Left arm, Patient Position: Chair, Cuff Size: Adult Regular)   Pulse 50   Wt 85.3 kg (188 lb)   SpO2 99%   BMI 32.02 kg/m      Do you need any medication refills at today's visit? No     Lico MACDONALD MA   Adult Endocrine   [facility]

## 2022-02-15 NOTE — PROGRESS NOTES
Endocrinology Clinic Visit    Chief Complaint: New Patient (referred by Sweta Zarate) and Thyroid Problem (Hyperthyroidism )     Information obtained from:Patient      Assessment/Treatment Plan:      Graves disease   Hyperthyroidism     Diagnosed in 2016. Last follow up in 2019. Methimazole recently restarted at PCP office. Clinically euthyroid. Rechecked thyroid labs today and plan is to continue methimazole at 5 mg daily based on normal free T4 level. Other modalities of therapy including I131 discussed. He would like to continue with methimazole for now.   ENDO THYROID LABS-[facility] Latest Ref Rng & Units 2/15/2022   TSH 0.40 - 4.00 mU/L <0.01 (L)   T3 60 - 181 ng/dL    FREE T4 0.76 - 1.46 ng/dL 1.25       Patient Instructions   Brandon Macias      Blood work today.     We will get back to after the results.     Continue Methimazole 5 mg daily.     Methimazole, medication used to treat graves disease is usually well tolerated and safe that it has a rare and serious side effects.  are and serious side effects are  1.  Agranulocytosis: this is a loss of the white cell count that fights an infection.  This occurs approximately 1 in 200 people  2. liver problems this is even more rare than a granulocytosis but it can be very serious     If you develop the following symptoms while taking methimazole please notify your doctor immediately: fever, signs of infection such as sore throat: Or flulike illness, nausea, vomiting, abdominal pain.  any time you have an infection please have your complete blood count checked to make sure that your white cell count is stable.      Blood work every 2 months while on methimazole. Blood work can be done at [facility]. Lab scheduling to schedule at any [place] lab locations: [phone] select option 1        Return to clinic in 3 months.    Test and/or medications prescribed today:  Orders Placed This Encounter   Procedures     US Thyroid     TSH     T4 free     T3 total      Thyroid stimulating immunoglobulin         Frieda Piper MD  Staff Endocrinologist    Division of Endocrinology and Diabetes      Subjective:         HPI: Brandon Macias is a 46 year old male with history of graves disease who is seen in consultation at Sweta Zarate's request for the same.     Diagnosed with graves disease in 2016.   Lost for follow up for > 3 years due to insurance issues.   Now started back on methimazole 5 mg daily - he is not sure for how long ~2-3 months.     No Anxiety, emotional lability, weakness, tremor, heat intolerance, increased perspiration, and no weight loss.  + palpitations occasionally.   No Eye irritation, tearing. Or double vision.    No smoking  Family history unknown - not sure.      Methimazole 5 mg daily.   BB and anticoagulation by cardiology.     Allergies   Allergen Reactions     No Known Drug Allergies        Current Outpatient Medications   Medication Sig Dispense Refill     methimazole (TAPAZOLE) 5 MG tablet Take 1 tablet (5 mg) by mouth daily 90 tablet 3     metoprolol tartrate (LOPRESSOR) 25 MG tablet Take 1 tablet (25 mg) by mouth 2 times daily 180 tablet 0     rivaroxaban ANTICOAGULANT (XARELTO) 20 MG TABS tablet Take 1 tablet (20 mg) by mouth daily (with dinner) 90 tablet 3       Review of Systems     11 point review system (Constitutional, HENT, Eyes, Respiratory, Cardiovascular, Gastrointestinal, Genitourinary, Musculoskeletal,Neurological, Psychiatric/Behavioural, Endocrine) is negative or is as per HPI above    Past Medical History:   Diagnosis Date     Graves disease      Past Surgical History:   Procedure Laterality Date     CV ASD CLOSURE       NO HISTORY OF SURGERY         Objective:   BP (!) 150/82 (BP Location: Left arm, Patient Position: Chair, Cuff Size: Adult Regular)   Pulse 50   Wt 85.3 kg (188 lb)   SpO2 99%   BMI 32.02 kg/m      Constitutional: Pleasant no acute cardiopulmonary distress.   EYES: anicteric, normal extra-ocular  movements, no lid lag or retraction, is equal and reactive to light bilaterally.  HEENT: Mouth/Throat: Mucous membrane is moist.  Thyroid examination: 2 times enlarged right > left.  Cardiovascular: RRR, S1, S2 normal.   Pulmonary/Chest: CTAB. No wheezing or rales.   Abdominal: +BS. Non tender to palpation.  Stretch marks: none.   Neurological: Alert and oriented.  No tremor and reflexes are symmetrical bilaterally and within the normal limits. Muscle strength 5/5.   Extremities: No edema.  Psychological: appropriate mood and affect   In House Labs:   Lab Results   Component Value Date    A1C 5.1 11/18/2016       TSH   Date Value Ref Range Status   02/15/2022 <0.01 (L) 0.40 - 4.00 mU/L Final   11/30/2021 <0.01 (L) 0.40 - 4.00 mU/L Final   09/16/2021 <0.01 (L) 0.40 - 4.00 mU/L Final   07/30/2019 <0.01 (L) 0.40 - 4.00 mU/L Final   04/15/2019 <0.01 (L) 0.40 - 4.00 mU/L Final   02/17/2017 <0.01 (L) 0.40 - 4.00 mU/L Final   12/23/2016 <0.01 (L) 0.40 - 4.00 mU/L Final   11/25/2016 <0.01 (L) 0.40 - 4.00 mU/L Final     T4 Free   Date Value Ref Range Status   07/30/2019 1.96 (H) 0.76 - 1.46 ng/dL Final   04/15/2019 4.43 (H) 0.76 - 1.46 ng/dL Final   02/17/2017 0.85 0.76 - 1.46 ng/dL Final   12/23/2016 1.88 (H) 0.76 - 1.46 ng/dL Final   11/25/2016 2.56 (H) 0.76 - 1.46 ng/dL Final     Free T4   Date Value Ref Range Status   02/15/2022 1.25 0.76 - 1.46 ng/dL Final   11/30/2021 2.32 (H) 0.76 - 1.46 ng/dL Final   09/16/2021 2.62 (H) 0.76 - 1.46 ng/dL Final       Creatinine   Date Value Ref Range Status   11/30/2021 0.64 (L) 0.66 - 1.25 mg/dL Final   07/30/2019 0.77 0.66 - 1.25 mg/dL Final   ]    This note has been dictated using voice recognition software.  As a result, there may be errors in the documentation that have gone undetected.  Please consider this when interpreting information in this documentation.    45 minutes spent on the date of the encounter doing counseling on the management of graves disease, chart review,  history and exam, documentation and further activities per the note.

## 2022-02-15 NOTE — LETTER
2/15/2022         RE: Brandon Macias  [address]  [facility] [address]        Dear Colleague,    Thank you for referring your patient, Brandon Macias, to the [facility]. Please see a copy of my visit note below.    Endocrinology Clinic Visit    Chief Complaint: New Patient (referred by Sweta Zarate) and Thyroid Problem (Hyperthyroidism )     Information obtained from:Patient      Assessment/Treatment Plan:      Graves disease   Hyperthyroidism     Diagnosed in 2016. Last follow up in 2019. Methimazole recently restarted at PCP office. Clinically euthyroid. Rechecked thyroid labs today and plan is to continue methimazole at 5 mg daily based on normal free T4 level. Other modalities of therapy including I131 discussed. He would like to continue with methimazole for now.   ENDO THYROID LABS-[facility] Latest Ref Rng & Units 2/15/2022   TSH 0.40 - 4.00 mU/L <0.01 (L)   T3 60 - 181 ng/dL    FREE T4 0.76 - 1.46 ng/dL 1.25       Patient Instructions   Brandon Macias      Blood work today.     We will get back to after the results.     Continue Methimazole 5 mg daily.     Methimazole, medication used to treat graves disease is usually well tolerated and safe that it has a rare and serious side effects.  are and serious side effects are  1.  Agranulocytosis: this is a loss of the white cell count that fights an infection.  This occurs approximately 1 in 200 people  2. liver problems this is even more rare than a granulocytosis but it can be very serious     If you develop the following symptoms while taking methimazole please notify your doctor immediately: fever, signs of infection such as sore throat: Or flulike illness, nausea, vomiting, abdominal pain.  any time you have an infection please have your complete blood count checked to make sure that your white cell count is stable.      Blood work every 2 months while on methimazole. Blood work can be done at [facility]. Lab scheduling to schedule at any  [facility] locations: [phone] select option 1        Return to clinic in 3 months.    Test and/or medications prescribed today:  Orders Placed This Encounter   Procedures     US Thyroid     TSH     T4 free     T3 total     Thyroid stimulating immunoglobulin         Frieda Piper MD  Staff Endocrinologist    Division of Endocrinology and Diabetes      Subjective:         HPI: Brandon Macias is a 46 year old male with history of graves disease who is seen in consultation at [facility]'s request for the same.     Diagnosed with graves disease in 2016.   Lost for follow up for > 3 years due to insurance issues.   Now started back on methimazole 5 mg daily - he is not sure for how long ~2-3 months.     No Anxiety, emotional lability, weakness, tremor, heat intolerance, increased perspiration, and no weight loss.  + palpitations occasionally.   No Eye irritation, tearing. Or double vision.    No smoking  Family history unknown - not sure.      Methimazole 5 mg daily.   BB and anticoagulation by cardiology.     Allergies   Allergen Reactions     No Known Drug Allergies        Current Outpatient Medications   Medication Sig Dispense Refill     methimazole (TAPAZOLE) 5 MG tablet Take 1 tablet (5 mg) by mouth daily 90 tablet 3     metoprolol tartrate (LOPRESSOR) 25 MG tablet Take 1 tablet (25 mg) by mouth 2 times daily 180 tablet 0     rivaroxaban ANTICOAGULANT (XARELTO) 20 MG TABS tablet Take 1 tablet (20 mg) by mouth daily (with dinner) 90 tablet 3       Review of Systems     11 point review system (Constitutional, HENT, Eyes, Respiratory, Cardiovascular, Gastrointestinal, Genitourinary, Musculoskeletal,Neurological, Psychiatric/Behavioural, Endocrine) is negative or is as per HPI above    Past Medical History:   Diagnosis Date     Graves disease      Past Surgical History:   Procedure Laterality Date     CV ASD CLOSURE       NO HISTORY OF SURGERY         Objective:   BP (!) 150/82 (BP  Location: Left arm, Patient Position: Chair, Cuff Size: Adult Regular)   Pulse 50   Wt 85.3 kg (188 lb)   SpO2 99%   BMI 32.02 kg/m      Constitutional: Pleasant no acute cardiopulmonary distress.   EYES: anicteric, normal extra-ocular movements, no lid lag or retraction, is equal and reactive to light bilaterally.  HEENT: Mouth/Throat: Mucous membrane is moist.  Thyroid examination: 2 times enlarged right > left.  Cardiovascular: RRR, S1, S2 normal.   Pulmonary/Chest: CTAB. No wheezing or rales.   Abdominal: +BS. Non tender to palpation.  Stretch marks: none.   Neurological: Alert and oriented.  No tremor and reflexes are symmetrical bilaterally and within the normal limits. Muscle strength 5/5.   Extremities: No edema.  Psychological: appropriate mood and affect   In House Labs:   Lab Results   Component Value Date    A1C 5.1 11/18/2016       TSH   Date Value Ref Range Status   02/15/2022 <0.01 (L) 0.40 - 4.00 mU/L Final   11/30/2021 <0.01 (L) 0.40 - 4.00 mU/L Final   09/16/2021 <0.01 (L) 0.40 - 4.00 mU/L Final   07/30/2019 <0.01 (L) 0.40 - 4.00 mU/L Final   04/15/2019 <0.01 (L) 0.40 - 4.00 mU/L Final   02/17/2017 <0.01 (L) 0.40 - 4.00 mU/L Final   12/23/2016 <0.01 (L) 0.40 - 4.00 mU/L Final   11/25/2016 <0.01 (L) 0.40 - 4.00 mU/L Final     T4 Free   Date Value Ref Range Status   07/30/2019 1.96 (H) 0.76 - 1.46 ng/dL Final   04/15/2019 4.43 (H) 0.76 - 1.46 ng/dL Final   02/17/2017 0.85 0.76 - 1.46 ng/dL Final   12/23/2016 1.88 (H) 0.76 - 1.46 ng/dL Final   11/25/2016 2.56 (H) 0.76 - 1.46 ng/dL Final     Free T4   Date Value Ref Range Status   02/15/2022 1.25 0.76 - 1.46 ng/dL Final   11/30/2021 2.32 (H) 0.76 - 1.46 ng/dL Final   09/16/2021 2.62 (H) 0.76 - 1.46 ng/dL Final       Creatinine   Date Value Ref Range Status   11/30/2021 0.64 (L) 0.66 - 1.25 mg/dL Final   07/30/2019 0.77 0.66 - 1.25 mg/dL Final   ]    This note has been dictated using voice recognition software.  As a result, there may be errors in  the documentation that have gone undetected.  Please consider this when interpreting information in this documentation.    45 minutes spent on the date of the encounter doing counseling on the management of graves disease, chart review, history and exam, documentation and further activities per the note.       Again, thank you for allowing me to participate in the care of your patient.        Sincerely,        Frieda Piper MD

## 2022-02-15 NOTE — PATIENT INSTRUCTIONS
Brandon Macias      Blood work today.     We will get back to after the results.     Continue Methimazole 5 mg daily.     Methimazole, medication used to treat graves disease is usually well tolerated and safe that it has a rare and serious side effects.  are and serious side effects are  1.  Agranulocytosis: this is a loss of the white cell count that fights an infection.  This occurs approximately 1 in 200 people  2. liver problems this is even more rare than a granulocytosis but it can be very serious     If you develop the following symptoms while taking methimazole please notify your doctor immediately: fever, signs of infection such as sore throat: Or flulike illness, nausea, vomiting, abdominal pain.  any time you have an infection please have your complete blood count checked to make sure that your white cell count is stable.      Blood work every 2 months while on methimazole. Blood work can be done at [facility]. Lab scheduling to schedule at any [place] lab locations: [phone] select option [phone]

## 2022-02-17 NOTE — RESULT ENCOUNTER NOTE
Brandon Macias,    The thyroid ultrasound showed findings consistent with graves disease. No other additional concerning features.     Please refer to the report for additional information.     Frieda Piper MD

## 2022-02-18 NOTE — RESULT ENCOUNTER NOTE
Mr. Brandon Macias    Please continue your current therapy with methimazole 5 mg daily based on thyroid blood work results from 2/15/22.    Thank you,   Frieda Piper MD

## 2022-02-22 LAB — TSI SER-ACNC: 5.1 TSI INDEX

## 2022-02-24 NOTE — RESULT ENCOUNTER NOTE
Mr. Brandon Macias  Didn't see previously sent message. Please call and inform the following message.      Please continue your current therapy with methimazole 5 mg daily based on thyroid blood work results from 2/15/22. The graves antibody is positive.      Thank you,   Frieda Piper MD

## 2022-02-28 ENCOUNTER — TELEPHONE (OUTPATIENT)
Dept: ENDOCRINOLOGY | Facility: CLINIC | Age: 47
End: 2022-02-28
Payer: COMMERCIAL

## 2022-02-28 DIAGNOSIS — E05.00 GRAVES DISEASE: ICD-10-CM

## 2022-02-28 RX ORDER — METHIMAZOLE 5 MG/1
5 TABLET ORAL DAILY
Qty: 90 TABLET | Refills: 1 | Status: SHIPPED | OUTPATIENT
Start: 2022-02-28 | End: 2022-09-01

## 2022-02-28 NOTE — TELEPHONE ENCOUNTER
Patient notified of results and recommendations. He verbalized understanding is agreeable with plan. He is requesting prescription be completed to [facility] as he is changing pharmacies.     Will forward to Dr. Piper to sign prescription.       Per Dr. Piper Result Note Message:  Didn't see previously sent message. Please call and inform the following message.       Please continue your current therapy with methimazole 5 mg daily based on thyroid blood work results from 2/15/22. The graves antibody is positive.     Cheyenne Garcia RN  Adult and Pediatric Endocrinology   [facility]         [Weight management counseling provided] : Weight management [Healthy eating counseling provided] : healthy eating [Activity counseling provided] : activity

## 2022-04-11 DIAGNOSIS — E05.00 GRAVES DISEASE: ICD-10-CM

## 2022-04-12 RX ORDER — METOPROLOL TARTRATE 25 MG/1
TABLET, FILM COATED ORAL
Qty: 180 TABLET | Refills: 0 | Status: SHIPPED | OUTPATIENT
Start: 2022-04-12 | End: 2022-07-11

## 2022-04-12 NOTE — TELEPHONE ENCOUNTER
Routing refill request to provider for review/approval because:  BP out of range.      Rufina Kruger RN  [facility]

## 2022-07-09 DIAGNOSIS — E05.00 GRAVES DISEASE: ICD-10-CM

## 2022-07-09 NOTE — TELEPHONE ENCOUNTER
Routing refill request to provider for review/approval because:  Labs out of range:  Blood Pressure

## 2022-07-11 RX ORDER — METOPROLOL TARTRATE 25 MG/1
25 TABLET, FILM COATED ORAL 2 TIMES DAILY
Qty: 180 TABLET | Refills: 0 | Status: SHIPPED | OUTPATIENT
Start: 2022-07-11 | End: 2023-02-15

## 2022-08-28 DIAGNOSIS — E05.00 GRAVES DISEASE: ICD-10-CM

## 2022-09-01 RX ORDER — METHIMAZOLE 5 MG/1
5 TABLET ORAL DAILY
Qty: 90 TABLET | Refills: 0 | Status: SHIPPED | OUTPATIENT
Start: 2022-09-01 | End: 2023-01-17

## 2022-09-01 NOTE — TELEPHONE ENCOUNTER
methimazole (TAPAZOLE) 5 MG tablet 90 tablet 1 2/28/2022         Last Written Prescription Date:  2-  Last Fill Quantity: 90,   # refills: 1  Last Office Visit : 2-  Future Office visit:  none    Routing refill request to provider for review/approval because:  Not on protocol.    Kathleen M Doege RN

## 2022-12-02 ENCOUNTER — TELEPHONE (OUTPATIENT)
Dept: FAMILY MEDICINE | Facility: CLINIC | Age: 47
End: 2022-12-02

## 2022-12-02 NOTE — TELEPHONE ENCOUNTER
Reason for Call:  Appointment Request    Patient requesting this type of appt:  Hospital/ED Follow-Up     Requested provider: Jack Horton    Reason patient unable to be scheduled: Not within requested timeframe    When does patient want to be seen/preferred time: 1-7- days    Comments: Pneumonia has not gone away since been seen in hospital and would like to schedule an appointment to follow up. Requesting medications to help out upon Dr's suggestion.    Could we send this information to you in [employer] or would you prefer to receive a phone call?:   Patient would like to be contacted via [employer] and/or [phone]. Can leave a detailed message.    Call taken on 12/2/2022 at 3:29 PM by Edis Del Rio    11/29/2022

## 2022-12-07 ENCOUNTER — OFFICE VISIT (OUTPATIENT)
Dept: FAMILY MEDICINE | Facility: CLINIC | Age: 47
End: 2022-12-07
Payer: COMMERCIAL

## 2022-12-07 VITALS
RESPIRATION RATE: 16 BRPM | WEIGHT: 190.4 LBS | OXYGEN SATURATION: 97 % | HEART RATE: 63 BPM | HEIGHT: 65 IN | TEMPERATURE: 97.9 F | SYSTOLIC BLOOD PRESSURE: 109 MMHG | DIASTOLIC BLOOD PRESSURE: 70 MMHG | BODY MASS INDEX: 31.72 KG/M2

## 2022-12-07 DIAGNOSIS — Z12.11 SCREEN FOR COLON CANCER: ICD-10-CM

## 2022-12-07 DIAGNOSIS — J10.1 INFLUENZA A: Primary | ICD-10-CM

## 2022-12-07 DIAGNOSIS — R05.1 ACUTE COUGH: ICD-10-CM

## 2022-12-07 DIAGNOSIS — Z23 ENCOUNTER FOR IMMUNIZATION: ICD-10-CM

## 2022-12-07 PROCEDURE — 0124A COVID-19 VACCINE BIVALENT BOOSTER 12+ (PFIZER): CPT | Performed by: FAMILY MEDICINE

## 2022-12-07 PROCEDURE — 99213 OFFICE O/P EST LOW 20 MIN: CPT | Mod: 25 | Performed by: FAMILY MEDICINE

## 2022-12-07 PROCEDURE — 91312 COVID-19 VACCINE BIVALENT BOOSTER 12+ (PFIZER): CPT | Performed by: FAMILY MEDICINE

## 2022-12-07 RX ORDER — BENZONATATE 200 MG/1
200 CAPSULE ORAL 3 TIMES DAILY PRN
Qty: 42 CAPSULE | Refills: 5 | Status: SHIPPED | OUTPATIENT
Start: 2022-12-07 | End: 2023-02-15

## 2022-12-07 ASSESSMENT — PAIN SCALES - GENERAL: PAINLEVEL: NO PAIN (0)

## 2022-12-07 NOTE — PROGRESS NOTES
"Jeanne Taylor is a 47 year old  presenting for the following health issues:  No chief complaint on file.      [facility]       Hospital Follow-up Visit:    Hospital/Nursing Home/IP Rehab Facility: [facility]  Date of Admission: 11/29/22  Date of Discharge: 11/29/22  Reason(s) for Admission: Community Acquired Pneumonia, influenza a    Was your hospitalization related to COVID-19? No   Problems taking medications regularly:  None  Medication changes since discharge: None  Problems adhering to non-medication therapy:  None    Summary of hospitalization:  CareEverywhere information obtained and reviewed  Diagnostic Tests/Treatments reviewed.  Follow up needed: none  Other Healthcare Providers Involved in Patient s Care:         None  Update since discharge: improved.    Plan of care communicated with patient       Review of Systems   Constitutional, HEENT, cardiovascular, pulmonary, GI, , musculoskeletal, neuro, skin, endocrine and psych systems are negative, except as otherwise noted.      Objective    /70 (BP Location: Right arm, Patient Position: Sitting, Cuff Size: Adult Large)   Pulse 63   Temp 97.9  F (36.6  C) (Oral)   Resp 16   Ht 1.651 m (5' 5\")   Wt 86.4 kg (190 lb 6.4 oz)   SpO2 97%   BMI 31.68 kg/m    Body mass index is 31.68 kg/m .  Physical Exam   GENERAL: healthy, alert and no distress  NECK: no adenopathy, no asymmetry, masses, or scars and thyroid normal to palpation  RESP: lungs clear to auscultation - no rales, rhonchi or wheezes  CV: regular rate and rhythm, normal S1 S2, no S3 or S4, no murmur, click or rub, no peripheral edema and peripheral pulses strong  ABDOMEN: soft, nontender, no hepatosplenomegaly, no masses and bowel sounds normal  MS: no gross musculoskeletal defects noted, no edema    A/P:  (J10.1) Influenza A  (primary encounter diagnosis)  Comment:   Plan: recovering. Continues to have lingering cough. Discussed this usually the last symptom " to improve. May take tessalon as needed.    (Z12.11) Screen for colon cancer  Comment:   Plan: COLOGUARD([employer]), Colonoscopy         Screening  Referral            (R05.1) Acute cough  Comment:   Plan: benzonatate (TESSALON) 200 MG capsule            (Z23) Encounter for immunization  Comment:   Plan: COVID-19 VACCINE BIVALENT BOOSTER 12+ (PFIZER)            Jack Horton MD

## 2022-12-24 ENCOUNTER — HEALTH MAINTENANCE LETTER (OUTPATIENT)
Age: 47
End: 2022-12-24

## 2023-01-12 LAB — NONINV COLON CA DNA+OCC BLD SCRN STL QL: NEGATIVE

## 2023-01-17 ENCOUNTER — TELEPHONE (OUTPATIENT)
Dept: FAMILY MEDICINE | Facility: CLINIC | Age: 48
End: 2023-01-17
Payer: COMMERCIAL

## 2023-01-17 DIAGNOSIS — E05.00 GRAVES DISEASE: ICD-10-CM

## 2023-01-17 RX ORDER — METHIMAZOLE 5 MG/1
5 TABLET ORAL DAILY
Qty: 90 TABLET | Refills: 0 | Status: SHIPPED | OUTPATIENT
Start: 2023-01-17 | End: 2023-02-14

## 2023-01-17 NOTE — TELEPHONE ENCOUNTER
Patient requesting refill, RN made patient an appointment 2/15/23 with PCP.    Vanessa RUSSELL RN, BSN   [facility]

## 2023-02-13 ENCOUNTER — LAB (OUTPATIENT)
Dept: LAB | Facility: CLINIC | Age: 48
End: 2023-02-13
Payer: COMMERCIAL

## 2023-02-13 DIAGNOSIS — Z13.1 SCREENING FOR DIABETES MELLITUS: ICD-10-CM

## 2023-02-13 DIAGNOSIS — E05.00 GRAVES DISEASE: ICD-10-CM

## 2023-02-13 DIAGNOSIS — E05.90 HYPERTHYROIDISM: ICD-10-CM

## 2023-02-13 DIAGNOSIS — Z13.6 CARDIOVASCULAR SCREENING; LDL GOAL LESS THAN 100: ICD-10-CM

## 2023-02-13 LAB
T4 FREE SERPL-MCNC: 0.77 NG/DL (ref 0.76–1.46)
TSH SERPL DL<=0.005 MIU/L-ACNC: 2.2 MU/L (ref 0.4–4)

## 2023-02-13 PROCEDURE — 80053 COMPREHEN METABOLIC PANEL: CPT

## 2023-02-13 PROCEDURE — 84439 ASSAY OF FREE THYROXINE: CPT

## 2023-02-13 PROCEDURE — 36415 COLL VENOUS BLD VENIPUNCTURE: CPT

## 2023-02-13 PROCEDURE — 80061 LIPID PANEL: CPT

## 2023-02-13 PROCEDURE — 84443 ASSAY THYROID STIM HORMONE: CPT

## 2023-02-14 ENCOUNTER — OFFICE VISIT (OUTPATIENT)
Dept: ENDOCRINOLOGY | Facility: CLINIC | Age: 48
End: 2023-02-14
Payer: COMMERCIAL

## 2023-02-14 VITALS
BODY MASS INDEX: 31.88 KG/M2 | HEART RATE: 55 BPM | SYSTOLIC BLOOD PRESSURE: 124 MMHG | DIASTOLIC BLOOD PRESSURE: 75 MMHG | OXYGEN SATURATION: 97 % | WEIGHT: 191.6 LBS

## 2023-02-14 DIAGNOSIS — E05.00 GRAVES DISEASE: ICD-10-CM

## 2023-02-14 PROCEDURE — 99214 OFFICE O/P EST MOD 30 MIN: CPT | Performed by: INTERNAL MEDICINE

## 2023-02-14 RX ORDER — METHIMAZOLE 5 MG/1
5 TABLET ORAL DAILY
Qty: 90 TABLET | Refills: 0 | Status: CANCELLED | OUTPATIENT
Start: 2023-02-14

## 2023-02-14 RX ORDER — METHIMAZOLE 5 MG/1
2.5 TABLET ORAL DAILY
Qty: 45 TABLET | Refills: 1 | Status: SHIPPED | OUTPATIENT
Start: 2023-02-14 | End: 2023-04-19

## 2023-02-14 NOTE — NURSING NOTE
Brandon Macias's goals for this visit include:   Chief Complaint   Patient presents with     Thyroid Disease     He requests these members of his care team be copied on today's visit information: No    PCP: Jack Horton    Referring Provider:  No referring provider defined for this encounter.    /75 (BP Location: Left arm, Patient Position: Sitting, Cuff Size: Adult Large)   Pulse 55   Wt 86.9 kg (191 lb 9.6 oz)   SpO2 97%   BMI 31.88 kg/m      Do you need any medication refills at today's visit? Yes

## 2023-02-14 NOTE — PROGRESS NOTES
Endocrinology Clinic Visit    Chief Complaint: Thyroid Disease     Information obtained from:Patient      Assessment/Treatment Plan:      Graves disease   Hyperthyroidism     Diagnosed in 2016.  On methimazole 5 mg daily. Other modalities of therapy including I131 discussed. He would like to continue with methimazole for now.   ENDO THYROID LABS-[facility] Latest Ref Rng & Units 2/13/2023   THYROID STIMULATING HORMONE 0.40 - 4.00 mU/L 2.20   FREE T4 0.76 - 1.46 ng/dL 0.77     Plan:      Methimazole 2.5 mg daily.   Check thyroid blood work in two months.     Patient Instructions     Brandon Macias      You have hyperthyroidism secondary to graves disease. This means you have a thyroid gland that makes too much thyroid hormone.       Graves' disease is treated by taking a medication like methimazole as you are currently doing. .  Please continue to take this medication at the dose indicated below.     Change methimazole medication as follows.   methimazole (TAPAZOLE) 5 MG tablet 45 tablet 1 2/14/2023  No   Sig - Route: Take 0.5 tablets (2.5 mg) by mouth daily - Oral     Methimazole is usually well tolerated and safe that it has a rare and serious side effects.  are and serious side effects are  1.  Agranulocytosis: this is a loss of the white cell count that fights an infection.  This occurs approximately 1 in 200 people  2. liver problems this is even more rare than a granulocytosis but it can be very serious   If you develop the following symptoms while taking methimazole please notify your doctor immediately: fever, signs of infection such as sore throat: Or flulike illness, nausea, vomiting, abdominal pain.  any time you have an infection please have your complete blood count checked to make sure that your white cell count is stable.        Please check blood work in April 2023.   Orders Placed This Encounter   Procedures     TSH     T4 free     Thyroid stimulating immunoglobulin               Return to clinic in 6  months.    Test and/or medications prescribed today:  Orders Placed This Encounter   Procedures     TSH     T4 free     Thyroid stimulating immunoglobulin         Frieda Piper MD  Staff Endocrinologist    Division of Endocrinology and Diabetes      Subjective:         HPI: Brandon Macias is a 47 year old male with history of graves disease who is here for a follow up. Second visit with us.   Diagnosed with graves disease in 2016.   Lost for follow up for > 3 years due to insurance issues.   Back on methimazole 5 mg daily one year ago     No Anxiety, emotional lability, weakness, tremor, heat intolerance, increased perspiration, and no weight loss.  No  palpitations   No Eye irritation, tearing. Or double vision.    No smoking  Family history unknown - not sure.      Methimazole 5 mg daily.   BB and anticoagulation by cardiology.     Allergies   Allergen Reactions     Salicylic Acid Rash       Current Outpatient Medications   Medication Sig Dispense Refill     methimazole (TAPAZOLE) 5 MG tablet Take 0.5 tablets (2.5 mg) by mouth daily 45 tablet 1     metoprolol tartrate (LOPRESSOR) 25 MG tablet Take 1 tablet (25 mg) by mouth 2 times daily Please follow up in clinic for next refill. 180 tablet 0     rivaroxaban ANTICOAGULANT (XARELTO) 20 MG TABS tablet Take 1 tablet (20 mg) by mouth daily (with dinner) 90 tablet 3     benzonatate (TESSALON) 200 MG capsule Take 1 capsule (200 mg) by mouth 3 times daily as needed for cough (Patient not taking: Reported on 2/14/2023) 42 capsule 5       Review of Systems     11 point review system (Constitutional, HENT, Eyes, Respiratory, Cardiovascular, Gastrointestinal, Genitourinary, Musculoskeletal,Neurological, Psychiatric/Behavioural, Endocrine) is negative or is as per HPI above    Past Medical History:   Diagnosis Date     Graves disease      Past Surgical History:   Procedure Laterality Date     CV ASD CLOSURE       NO HISTORY OF SURGERY         Objective:   /75 (BP  Location: Left arm, Patient Position: Sitting, Cuff Size: Adult Large)   Pulse 55   Wt 86.9 kg (191 lb 9.6 oz)   SpO2 97%   BMI 31.88 kg/m      Constitutional: Pleasant no acute cardiopulmonary distress.   EYES: anicteric, normal extra-ocular movements, no lid lag or retraction, is equal and reactive to light bilaterally.  HEENT: Mouth/Throat: Mucous membrane is moist.  Thyroid examination: 1.5 times enlarged right > left.  Cardiovascular: RRR, S1, S2 normal.   Pulmonary/Chest: CTAB. No wheezing or rales.   Abdominal: +BS. Non tender to palpation.  Stretch marks: none.   Neurological: Alert and oriented.  No tremor and reflexes are symmetrical bilaterally and within the normal limits. Muscle strength 5/5.   Extremities: No edema.  Psychological: appropriate mood and affect   In House Labs:   Lab Results   Component Value Date    A1C 5.1 11/18/2016       TSH   Date Value Ref Range Status   02/13/2023 2.20 0.40 - 4.00 mU/L Final   02/15/2022 <0.01 (L) 0.40 - 4.00 mU/L Final   11/30/2021 <0.01 (L) 0.40 - 4.00 mU/L Final   09/16/2021 <0.01 (L) 0.40 - 4.00 mU/L Final   07/30/2019 <0.01 (L) 0.40 - 4.00 mU/L Final   04/15/2019 <0.01 (L) 0.40 - 4.00 mU/L Final   02/17/2017 <0.01 (L) 0.40 - 4.00 mU/L Final   12/23/2016 <0.01 (L) 0.40 - 4.00 mU/L Final   11/25/2016 <0.01 (L) 0.40 - 4.00 mU/L Final     T4 Free   Date Value Ref Range Status   07/30/2019 1.96 (H) 0.76 - 1.46 ng/dL Final   04/15/2019 4.43 (H) 0.76 - 1.46 ng/dL Final   02/17/2017 0.85 0.76 - 1.46 ng/dL Final   12/23/2016 1.88 (H) 0.76 - 1.46 ng/dL Final   11/25/2016 2.56 (H) 0.76 - 1.46 ng/dL Final     Free T4   Date Value Ref Range Status   02/13/2023 0.77 0.76 - 1.46 ng/dL Final   02/15/2022 1.25 0.76 - 1.46 ng/dL Final   11/30/2021 2.32 (H) 0.76 - 1.46 ng/dL Final   09/16/2021 2.62 (H) 0.76 - 1.46 ng/dL Final       Creatinine   Date Value Ref Range Status   11/30/2021 0.64 (L) 0.66 - 1.25 mg/dL Final   07/30/2019 0.77 0.66 - 1.25 mg/dL Final   ]  US THYROID  2/15/2022 4:19 PM     COMPARISON: None     HISTORY: Hyperthyroidism; Graves disease     FINDINGS:   Thyroid parenchyma: Heterogeneous  The right lobe of the thyroid measures: 5.3 x 1.9 x 2.4 cm  The left lobe of the thyroid measures: 5.1 x 2.2 x 1.7 cm  The thyroid isthmus measures: 0.3 cm     No discrete thyroid nodules.                                                                       Impression:  Diffusely heterogeneous thyroid gland without discrete thyroid  nodules.     This note has been dictated using voice recognition software.  As a result, there may be errors in the documentation that have gone undetected.  Please consider this when interpreting information in this documentation.

## 2023-02-14 NOTE — LETTER
2/14/2023         RE: Brandon Macias  [address]  [facility] [address]        Dear Colleague,    Thank you for referring your patient, Brandon Macias, to the [facility]. Please see a copy of my visit note below.    Endocrinology Clinic Visit    Chief Complaint: Thyroid Disease     Information obtained from:Patient      Assessment/Treatment Plan:      Graves disease   Hyperthyroidism     Diagnosed in 2016.  On methimazole 5 mg daily. Other modalities of therapy including I131 discussed. He would like to continue with methimazole for now.   ENDO THYROID LABS-[facility] Latest Ref Rng & Units 2/13/2023   THYROID STIMULATING HORMONE 0.40 - 4.00 mU/L 2.20   FREE T4 0.76 - 1.46 ng/dL 0.77     Plan:      Methimazole 2.5 mg daily.   Check thyroid blood work in two months.     Patient Instructions     Brandon Macias      You have hyperthyroidism secondary to graves disease. This means you have a thyroid gland that makes too much thyroid hormone.       Graves' disease is treated by taking a medication like methimazole as you are currently doing. .  Please continue to take this medication at the dose indicated below.     Change methimazole medication as follows.   methimazole (TAPAZOLE) 5 MG tablet 45 tablet 1 2/14/2023  No   Sig - Route: Take 0.5 tablets (2.5 mg) by mouth daily - Oral     Methimazole is usually well tolerated and safe that it has a rare and serious side effects.  are and serious side effects are  1.  Agranulocytosis: this is a loss of the white cell count that fights an infection.  This occurs approximately 1 in 200 people  2. liver problems this is even more rare than a granulocytosis but it can be very serious   If you develop the following symptoms while taking methimazole please notify your doctor immediately: fever, signs of infection such as sore throat: Or flulike illness, nausea, vomiting, abdominal pain.  any time you have an infection please have your complete blood count checked to  make sure that your white cell count is stable.        Please check blood work in April 2023.   Orders Placed This Encounter   Procedures     TSH     T4 free     Thyroid stimulating immunoglobulin               Return to clinic in 6 months.    Test and/or medications prescribed today:  Orders Placed This Encounter   Procedures     TSH     T4 free     Thyroid stimulating immunoglobulin         Frieda Piper MD  Staff Endocrinologist    Division of Endocrinology and Diabetes      Subjective:         HPI: Brandon Macias is a 47 year old male with history of graves disease who is here for a follow up. Second visit with us.   Diagnosed with graves disease in 2016.   Lost for follow up for > 3 years due to insurance issues.   Back on methimazole 5 mg daily one year ago     No Anxiety, emotional lability, weakness, tremor, heat intolerance, increased perspiration, and no weight loss.  No  palpitations   No Eye irritation, tearing. Or double vision.    No smoking  Family history unknown - not sure.      Methimazole 5 mg daily.   BB and anticoagulation by cardiology.     Allergies   Allergen Reactions     Salicylic Acid Rash       Current Outpatient Medications   Medication Sig Dispense Refill     methimazole (TAPAZOLE) 5 MG tablet Take 0.5 tablets (2.5 mg) by mouth daily 45 tablet 1     metoprolol tartrate (LOPRESSOR) 25 MG tablet Take 1 tablet (25 mg) by mouth 2 times daily Please follow up in clinic for next refill. 180 tablet 0     rivaroxaban ANTICOAGULANT (XARELTO) 20 MG TABS tablet Take 1 tablet (20 mg) by mouth daily (with dinner) 90 tablet 3     benzonatate (TESSALON) 200 MG capsule Take 1 capsule (200 mg) by mouth 3 times daily as needed for cough (Patient not taking: Reported on 2/14/2023) 42 capsule 5       Review of Systems     11 point review system (Constitutional, HENT, Eyes, Respiratory, Cardiovascular, Gastrointestinal, Genitourinary, Musculoskeletal,Neurological, Psychiatric/Behavioural, Endocrine) is  negative or is as per HPI above    Past Medical History:   Diagnosis Date     Graves disease      Past Surgical History:   Procedure Laterality Date     CV ASD CLOSURE       NO HISTORY OF SURGERY         Objective:   /75 (BP Location: Left arm, Patient Position: Sitting, Cuff Size: Adult Large)   Pulse 55   Wt 86.9 kg (191 lb 9.6 oz)   SpO2 97%   BMI 31.88 kg/m      Constitutional: Pleasant no acute cardiopulmonary distress.   EYES: anicteric, normal extra-ocular movements, no lid lag or retraction, is equal and reactive to light bilaterally.  HEENT: Mouth/Throat: Mucous membrane is moist.  Thyroid examination: 1.5 times enlarged right > left.  Cardiovascular: RRR, S1, S2 normal.   Pulmonary/Chest: CTAB. No wheezing or rales.   Abdominal: +BS. Non tender to palpation.  Stretch marks: none.   Neurological: Alert and oriented.  No tremor and reflexes are symmetrical bilaterally and within the normal limits. Muscle strength 5/5.   Extremities: No edema.  Psychological: appropriate mood and affect   In House Labs:   Lab Results   Component Value Date    A1C 5.1 11/18/2016       TSH   Date Value Ref Range Status   02/13/2023 2.20 0.40 - 4.00 mU/L Final   02/15/2022 <0.01 (L) 0.40 - 4.00 mU/L Final   11/30/2021 <0.01 (L) 0.40 - 4.00 mU/L Final   09/16/2021 <0.01 (L) 0.40 - 4.00 mU/L Final   07/30/2019 <0.01 (L) 0.40 - 4.00 mU/L Final   04/15/2019 <0.01 (L) 0.40 - 4.00 mU/L Final   02/17/2017 <0.01 (L) 0.40 - 4.00 mU/L Final   12/23/2016 <0.01 (L) 0.40 - 4.00 mU/L Final   11/25/2016 <0.01 (L) 0.40 - 4.00 mU/L Final     T4 Free   Date Value Ref Range Status   07/30/2019 1.96 (H) 0.76 - 1.46 ng/dL Final   04/15/2019 4.43 (H) 0.76 - 1.46 ng/dL Final   02/17/2017 0.85 0.76 - 1.46 ng/dL Final   12/23/2016 1.88 (H) 0.76 - 1.46 ng/dL Final   11/25/2016 2.56 (H) 0.76 - 1.46 ng/dL Final     Free T4   Date Value Ref Range Status   02/13/2023 0.77 0.76 - 1.46 ng/dL Final   02/15/2022 1.25 0.76 - 1.46 ng/dL Final    11/30/2021 2.32 (H) 0.76 - 1.46 ng/dL Final   09/16/2021 2.62 (H) 0.76 - 1.46 ng/dL Final       Creatinine   Date Value Ref Range Status   11/30/2021 0.64 (L) 0.66 - 1.25 mg/dL Final   07/30/2019 0.77 0.66 - 1.25 mg/dL Final   ]  US THYROID 2/15/2022 4:19 PM     COMPARISON: None     HISTORY: Hyperthyroidism; Graves disease     FINDINGS:   Thyroid parenchyma: Heterogeneous  The right lobe of the thyroid measures: 5.3 x 1.9 x 2.4 cm  The left lobe of the thyroid measures: 5.1 x 2.2 x 1.7 cm  The thyroid isthmus measures: 0.3 cm     No discrete thyroid nodules.                                                                       Impression:  Diffusely heterogeneous thyroid gland without discrete thyroid  nodules.     This note has been dictated using voice recognition software.  As a result, there may be errors in the documentation that have gone undetected.  Please consider this when interpreting information in this documentation.          Again, thank you for allowing me to participate in the care of your patient.        Sincerely,        Frieda Piper MD

## 2023-02-14 NOTE — PATIENT INSTRUCTIONS
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 This means you have a thyroid gland that makes too much thyroid hormone.     Graves' disease is treated by taking a medication like methimazole as you are currently doing. .  Please continue to take this medication at the dose indicated below.     Change methimazole medication as follows.   methimazole (TAPAZOLE) 5 MG tablet 45 tablet 1 2/14/2023  No   Sig - Route: Take 0.5 tablets (2.5 mg) by mouth daily - Oral     Methimazole is usually well tolerated and safe that it has a rare and serious side effects.  are and serious side effects are  1.  Agranulocytosis: this is a loss of the white cell count that fights an infection.  This occurs approximately 1 in 200 people  2. liver problems this is even more rare than a granulocytosis but it can be very serious   If you develop the following symptoms while taking methimazole please notify your doctor immediately: fever, signs of infection such as sore throat: Or flulike illness, nausea, vomiting, abdominal pain.  any time you have an infection please have your complete blood count checked to make sure that your white cell count is stable.        Please check blood work in April 2023.   Orders Placed This Encounter   Procedures    TSH    T4 free    Thyroid stimulating immunoglobulin

## 2023-02-15 ENCOUNTER — OFFICE VISIT (OUTPATIENT)
Dept: FAMILY MEDICINE | Facility: CLINIC | Age: 48
End: 2023-02-15
Payer: COMMERCIAL

## 2023-02-15 VITALS
OXYGEN SATURATION: 96 % | BODY MASS INDEX: 31.82 KG/M2 | DIASTOLIC BLOOD PRESSURE: 71 MMHG | RESPIRATION RATE: 18 BRPM | HEIGHT: 65 IN | SYSTOLIC BLOOD PRESSURE: 111 MMHG | TEMPERATURE: 98.4 F | HEART RATE: 53 BPM | WEIGHT: 191 LBS

## 2023-02-15 DIAGNOSIS — Z13.6 CARDIOVASCULAR SCREENING; LDL GOAL LESS THAN 100: ICD-10-CM

## 2023-02-15 DIAGNOSIS — I48.0 PAROXYSMAL A-FIB (H): ICD-10-CM

## 2023-02-15 DIAGNOSIS — Z23 ENCOUNTER FOR IMMUNIZATION: ICD-10-CM

## 2023-02-15 DIAGNOSIS — I63.332 CEREBROVASCULAR ACCIDENT (CVA) DUE TO THROMBOSIS OF LEFT POSTERIOR CEREBRAL ARTERY (H): ICD-10-CM

## 2023-02-15 DIAGNOSIS — Z00.00 ROUTINE GENERAL MEDICAL EXAMINATION AT A HEALTH CARE FACILITY: Primary | ICD-10-CM

## 2023-02-15 DIAGNOSIS — E05.00 GRAVES DISEASE: ICD-10-CM

## 2023-02-15 DIAGNOSIS — Z13.1 SCREENING FOR DIABETES MELLITUS: ICD-10-CM

## 2023-02-15 LAB
ALBUMIN SERPL-MCNC: 4 G/DL (ref 3.4–5)
ALP SERPL-CCNC: 86 U/L (ref 40–150)
ALT SERPL W P-5'-P-CCNC: 32 U/L (ref 0–70)
ANION GAP SERPL CALCULATED.3IONS-SCNC: 7 MMOL/L (ref 3–14)
AST SERPL W P-5'-P-CCNC: 19 U/L (ref 0–45)
BILIRUB SERPL-MCNC: 0.7 MG/DL (ref 0.2–1.3)
BUN SERPL-MCNC: 20 MG/DL (ref 7–30)
CALCIUM SERPL-MCNC: 8.8 MG/DL (ref 8.5–10.1)
CHLORIDE BLD-SCNC: 107 MMOL/L (ref 94–109)
CHOLEST SERPL-MCNC: 167 MG/DL
CO2 SERPL-SCNC: 26 MMOL/L (ref 20–32)
CREAT SERPL-MCNC: 1.09 MG/DL (ref 0.66–1.25)
GFR SERPL CREATININE-BSD FRML MDRD: 83 ML/MIN/1.73M2
GLUCOSE BLD-MCNC: 94 MG/DL (ref 70–99)
HDLC SERPL-MCNC: 41 MG/DL
LDLC SERPL CALC-MCNC: 80 MG/DL
NONHDLC SERPL-MCNC: 126 MG/DL
POTASSIUM BLD-SCNC: 3.8 MMOL/L (ref 3.4–5.3)
PROT SERPL-MCNC: 8.2 G/DL (ref 6.8–8.8)
SODIUM SERPL-SCNC: 140 MMOL/L (ref 133–144)
TRIGL SERPL-MCNC: 231 MG/DL

## 2023-02-15 PROCEDURE — 99396 PREV VISIT EST AGE 40-64: CPT | Mod: 25 | Performed by: FAMILY MEDICINE

## 2023-02-15 PROCEDURE — 90686 IIV4 VACC NO PRSV 0.5 ML IM: CPT | Performed by: FAMILY MEDICINE

## 2023-02-15 PROCEDURE — 90471 IMMUNIZATION ADMIN: CPT | Performed by: FAMILY MEDICINE

## 2023-02-15 RX ORDER — METOPROLOL TARTRATE 25 MG/1
25 TABLET, FILM COATED ORAL 2 TIMES DAILY
Qty: 180 TABLET | Refills: 3 | Status: SHIPPED | OUTPATIENT
Start: 2023-02-15 | End: 2024-04-13

## 2023-02-15 RX ORDER — ATORVASTATIN CALCIUM 20 MG/1
20 TABLET, FILM COATED ORAL DAILY
Qty: 90 TABLET | Refills: 3 | Status: CANCELLED | OUTPATIENT
Start: 2023-02-15

## 2023-02-15 ASSESSMENT — PAIN SCALES - GENERAL: PAINLEVEL: NO PAIN (0)

## 2023-02-15 NOTE — PROGRESS NOTES
"Jeanne Taylor is a 47 year old presenting for the following health issues:  Recheck Medication and Results      History of Present Illness       Reason for visit:  Follow up    He eats 0-1 servings of fruits and vegetables daily.He consumes 1 sweetened beverage(s) daily.He exercises with enough effort to increase his heart rate 10 to 19 minutes per day.  He exercises with enough effort to increase his heart rate 4 days per week.   He is taking medications regularly.       Annual Physical Visit    Patient has been advised of split billing requirements and indicates understanding: Yes       Physical Health:    In general, how would you rate your overall physical health? good    Outside of work, how many days during the week do you exercise?none    Outside of work, approximately how many minutes a day do you exercise?not applicable    If you drink alcohol do you typically have >3 drinks per day or >7 drinks per week? No    Do you usually eat at least 4 servings of fruit and vegetables a day, include whole grains & fiber and avoid regularly eating high fat or \"junk\" foods? Yes    Do you have any problems taking medications regularly? No    Do you have any side effects from medications? none    Needs assistance for the following daily activities: no assistance needed    Which of the following safety concerns are present in your home?  none identified     Hearing impairment: No    In the past 6 months, have you been bothered by leaking of urine? no    Mental Health:    In general, how would you rate your overall mental or emotional health? excellent  PHQ-2 Score: 0    Do you feel safe in your environment? Yes      Current providers sharing in care for this patient include:   Patient Care Team:  Jack Horton MD as PCP - General (Family Practice)  Frieda Piper MD as MD (Endocrinology, Diabetes, and Metabolism)  Jack Horton MD as Assigned PCP  Frieda Piper MD as Assigned Endocrinology Provider    Patient " "has been advised of split billing requirements and indicates understanding: Yes      Review of Systems   Constitutional, HEENT, cardiovascular, pulmonary, GI, , musculoskeletal, neuro, skin, endocrine and psych systems are negative, except as otherwise noted.      Objective    /71   Pulse 53   Temp 98.4  F (36.9  C) (Tympanic)   Resp 18   Ht 1.651 m (5' 5\")   Wt 86.6 kg (191 lb)   SpO2 96%   BMI 31.78 kg/m    Body mass index is 31.78 kg/m .  Physical Exam   GENERAL: healthy, alert and no distress  NECK: no adenopathy, no asymmetry, masses, or scars and thyroid normal to palpation  RESP: lungs clear to auscultation - no rales, rhonchi or wheezes  CV: regular rate and rhythm, normal S1 S2, no S3 or S4, no murmur, click or rub, no peripheral edema and peripheral pulses strong  ABDOMEN: soft, nontender, no hepatosplenomegaly, no masses and bowel sounds normal  MS: no gross musculoskeletal defects noted, no edema    A/P:  (Z00.00) Routine general medical examination at a health care facility  (primary encounter diagnosis)  Comment:   Plan: as below.    (I63.332) Cerebrovascular accident (CVA) due to thrombosis of left posterior cerebral artery (H)  Comment:   Plan: rivaroxaban ANTICOAGULANT (XARELTO) 20 MG TABS         tablet        Continue with Xarelto.     (I48.0) Paroxysmal A-fib (H)  Comment:   Plan: metoprolol tartrate (LOPRESSOR) 25 MG tablet,         rivaroxaban ANTICOAGULANT (XARELTO) 20 MG TABS         tablet        Rate controlled. Continue with Xarelto. Follows Cardiology.    (E05.00) Graves disease  Comment:   Plan: metoprolol tartrate (LOPRESSOR) 25 MG tablet        tft's wnl's. Following Endocrinology.    (Z13.1) Screening for diabetes mellitus  Comment:   Plan: Comprehensive metabolic panel (BMP + Alb, Alk         Phos, ALT, AST, Total. Bili, TP)            (Z13.6) CARDIOVASCULAR SCREENING; LDL GOAL LESS THAN 100  Comment:   Plan: Lipid panel reflex to direct LDL Fasting            (Z23) " Encounter for immunization  Comment:   Plan: INFLUENZA VACCINE >6 MONTHS (AFLURIA/FLUZONE)            Jack Horton MD

## 2023-02-15 NOTE — PATIENT INSTRUCTIONS
At [facility], we strive to deliver an exceptional experience to you, every time we see you. If you receive a survey, please complete it as we do value your feedback.  If you have MyChart, you can expect to receive results automatically within 24 hours of their completion.  Your provider will send a note interpreting your results as well.   If you do not have MyChart, you should receive your results in about a week by mail.    Your care team:                            Family Medicine Internal Medicine   MD Clemente De MD Shantel Branch-Fleming, MD Srinivasa Vaka, MD Katya Belousova, PALILLY Gibbons CNP, MD ([facility]   Lebron Reveles, MD Delia Alatorre MD Amelia Massimini APRN CHA Gonzalez APRN MD Adina Rodriguez MD          Clinic hours: Monday - Thursday 7 am-6 pm; Fridays 7 am-5 pm.   Urgent care: Monday - Friday 10 am- 8 pm; Saturday and Sunday 9 am-5 pm.    Clinic: [phone]       [facility]: Monday - Thursday 8 am - 7 pm; Friday 8 am - 6 pm  [facility]: [phone]

## 2023-08-14 ENCOUNTER — VIRTUAL VISIT (OUTPATIENT)
Dept: ENDOCRINOLOGY | Facility: CLINIC | Age: 48
End: 2023-08-14
Payer: COMMERCIAL

## 2023-08-14 DIAGNOSIS — E05.00 GRAVES DISEASE: Primary | ICD-10-CM

## 2023-08-14 DIAGNOSIS — E05.90 HYPERTHYROIDISM: ICD-10-CM

## 2023-08-14 LAB
T4 FREE SERPL-MCNC: 1.23 NG/DL (ref 0.9–1.7)
TSH SERPL DL<=0.005 MIU/L-ACNC: 2.01 UIU/ML (ref 0.3–4.2)

## 2023-08-14 PROCEDURE — 84445 ASSAY OF TSI GLOBULIN: CPT | Mod: 90 | Performed by: INTERNAL MEDICINE

## 2023-08-14 PROCEDURE — 99000 SPECIMEN HANDLING OFFICE-LAB: CPT | Mod: VID | Performed by: INTERNAL MEDICINE

## 2023-08-14 PROCEDURE — 36415 COLL VENOUS BLD VENIPUNCTURE: CPT | Mod: VID | Performed by: INTERNAL MEDICINE

## 2023-08-14 PROCEDURE — 99214 OFFICE O/P EST MOD 30 MIN: CPT | Mod: VID | Performed by: INTERNAL MEDICINE

## 2023-08-14 PROCEDURE — 84439 ASSAY OF FREE THYROXINE: CPT | Mod: VID | Performed by: INTERNAL MEDICINE

## 2023-08-14 PROCEDURE — 84443 ASSAY THYROID STIM HORMONE: CPT | Mod: VID | Performed by: INTERNAL MEDICINE

## 2023-08-14 ASSESSMENT — PAIN SCALES - GENERAL: PAINLEVEL: NO PAIN (0)

## 2023-08-14 NOTE — LETTER
8/14/2023         RE: Brandon Macias  [address]  [facility] [address]        Dear Colleague,    Thank you for referring your patient, Brandon Macias, to the [facility]. Please see a copy of my visit note below.    Endocrinology Clinic Visit    Chief Complaint: RECHECK     Information obtained from:Patient      Assessment/Treatment Plan:      Graves disease   Hyperthyroidism     Diagnosed in 2016.  On methimazole 5 mg daily. Other modalities of therapy including I131 discussed. He would like to continue with methimazole for now.   ENDO THYROID LABS-[facility] Latest Ref Rng & Units 2/13/2023   THYROID STIMULATING HORMONE 0.40 - 4.00 mU/L 2.20   FREE T4 0.76 - 1.46 ng/dL 0.77     Plan:      Methimazole 2.5 mg daily.   Check thyroid blood work today.     Patient Instructions   Brandon Macias      You have hyperthyroidism secondary to graves disease. This means you have a thyroid gland that makes too much thyroid hormone.     Graves' disease is treated by taking a medication like methimazole as you are currently doing. .  Please continue to take this medication at the dose indicated below.   Methimazole is usually well tolerated and safe that it has a rare and serious side effects.  are and serious side effects are  1.  Agranulocytosis: this is a loss of the white cell count that fights an infection.  This occurs approximately 1 in 200 people  2. liver problems this is even more rare than a granulocytosis but it can be very serious   If you develop the following symptoms while taking methimazole please notify your doctor immediately: fever, signs of infection such as sore throat: Or flulike illness, nausea, vomiting, abdominal pain.  any time you have an infection please have your complete blood count checked to make sure that your white cell count is stable.        Please check blood work today and we will adjust the dose of methimazole based on the results.  Please check thyroid blood work every 3  months as long as you are on methimazole treatment             Test and/or medications prescribed today:  Orders Placed This Encounter   Procedures     T4 free     TSH         Frieda Piper MD  Staff Endocrinologist    Division of Endocrinology and Diabetes      Subjective:         HPI: Brandon Macias is a 48 year old male with history of graves disease who is here for a follow up.   Diagnosed with graves disease in 2016.   Lost for follow up for > 3 years due to insurance issues previously.    Currently on methimazole 2.5 mg daily   No Anxiety, emotional lability, weakness, tremor, heat intolerance, increased perspiration, and no weight loss.  No  palpitations   No Eye irritation, tearing. Or double vision.    No smoking  Family history unknown - not sure.    BB and anticoagulation by cardiology.     Allergies   Allergen Reactions     Salicylic Acid Rash       Current Outpatient Medications   Medication Sig Dispense Refill     methimazole (TAPAZOLE) 5 MG tablet Take 0.5 tablets (2.5 mg) by mouth daily 45 tablet 1     metoprolol tartrate (LOPRESSOR) 25 MG tablet Take 1 tablet (25 mg) by mouth 2 times daily 180 tablet 3     rivaroxaban ANTICOAGULANT (XARELTO) 20 MG TABS tablet Take 1 tablet (20 mg) by mouth daily (with dinner) 90 tablet 3       Review of Systems     11 point review system (Constitutional, HENT, Eyes, Respiratory, Cardiovascular, Gastrointestinal, Genitourinary, Musculoskeletal,Neurological, Psychiatric/Behavioural, Endocrine) is negative or is as per HPI above    Past Medical History:   Diagnosis Date     Graves disease      Past Surgical History:   Procedure Laterality Date     CV ASD CLOSURE       NO HISTORY OF SURGERY         Objective:   There were no vitals taken for this visit.    Constitutional: Pleasant no acute cardiopulmonary distress.   Psychological: appropriate mood and affect   In House Labs:   Lab Results   Component Value Date    A1C 5.1 11/18/2016       TSH   Date Value Ref Range  Status   02/13/2023 2.20 0.40 - 4.00 mU/L Final   02/15/2022 <0.01 (L) 0.40 - 4.00 mU/L Final   11/30/2021 <0.01 (L) 0.40 - 4.00 mU/L Final   09/16/2021 <0.01 (L) 0.40 - 4.00 mU/L Final   07/30/2019 <0.01 (L) 0.40 - 4.00 mU/L Final   04/15/2019 <0.01 (L) 0.40 - 4.00 mU/L Final   02/17/2017 <0.01 (L) 0.40 - 4.00 mU/L Final   12/23/2016 <0.01 (L) 0.40 - 4.00 mU/L Final   11/25/2016 <0.01 (L) 0.40 - 4.00 mU/L Final     T4 Free   Date Value Ref Range Status   07/30/2019 1.96 (H) 0.76 - 1.46 ng/dL Final   04/15/2019 4.43 (H) 0.76 - 1.46 ng/dL Final   02/17/2017 0.85 0.76 - 1.46 ng/dL Final   12/23/2016 1.88 (H) 0.76 - 1.46 ng/dL Final   11/25/2016 2.56 (H) 0.76 - 1.46 ng/dL Final     Free T4   Date Value Ref Range Status   02/13/2023 0.77 0.76 - 1.46 ng/dL Final   02/15/2022 1.25 0.76 - 1.46 ng/dL Final   11/30/2021 2.32 (H) 0.76 - 1.46 ng/dL Final   09/16/2021 2.62 (H) 0.76 - 1.46 ng/dL Final       Creatinine   Date Value Ref Range Status   02/13/2023 1.09 0.66 - 1.25 mg/dL Final   07/30/2019 0.77 0.66 - 1.25 mg/dL Final   ]  US THYROID 2/15/2022 4:19 PM     COMPARISON: None     HISTORY: Hyperthyroidism; Graves disease     FINDINGS:   Thyroid parenchyma: Heterogeneous  The right lobe of the thyroid measures: 5.3 x 1.9 x 2.4 cm  The left lobe of the thyroid measures: 5.1 x 2.2 x 1.7 cm  The thyroid isthmus measures: 0.3 cm     No discrete thyroid nodules.                                                                       Impression:  Diffusely heterogeneous thyroid gland without discrete thyroid  nodules.     This note has been dictated using voice recognition software.  As a result, there may be errors in the documentation that have gone undetected.  Please consider this when interpreting information in this documentation.        Video-Visit Details    Type of service:  Video Visit  Joined the call at 8/14/2023, 11:30:53 am.  Left the call at 8/14/2023, 11:36:12 am.  You were on the call for 5 minutes 18 seconds  .  Distant Location (provider location):  Off-site.     Platform used for Video Visit: [employer]      Again, thank you for allowing me to participate in the care of your patient.        Sincerely,        Frieda Piper MD

## 2023-08-14 NOTE — PROGRESS NOTES
Endocrinology Clinic Visit    Chief Complaint: RECHECK     Information obtained from:Patient      Assessment/Treatment Plan:      Graves disease   Hyperthyroidism     Diagnosed in 2016.  On methimazole 5 mg daily. Other modalities of therapy including I131 discussed. He would like to continue with methimazole for now.   ENDO THYROID LABS-[facility] Latest Ref Rng & Units 2/13/2023   THYROID STIMULATING HORMONE 0.40 - 4.00 mU/L 2.20   FREE T4 0.76 - 1.46 ng/dL 0.77     Plan:      Methimazole 2.5 mg daily.   Check thyroid blood work today.     Patient Instructions   Brandon Macias      You have hyperthyroidism secondary to graves disease. This means you have a thyroid gland that makes too much thyroid hormone.     Graves' disease is treated by taking a medication like methimazole as you are currently doing. .  Please continue to take this medication at the dose indicated below.   Methimazole is usually well tolerated and safe that it has a rare and serious side effects.  are and serious side effects are  1.  Agranulocytosis: this is a loss of the white cell count that fights an infection.  This occurs approximately 1 in 200 people  2. liver problems this is even more rare than a granulocytosis but it can be very serious   If you develop the following symptoms while taking methimazole please notify your doctor immediately: fever, signs of infection such as sore throat: Or flulike illness, nausea, vomiting, abdominal pain.  any time you have an infection please have your complete blood count checked to make sure that your white cell count is stable.        Please check blood work today and we will adjust the dose of methimazole based on the results.  Please check thyroid blood work every 3 months as long as you are on methimazole treatment             Test and/or medications prescribed today:  Orders Placed This Encounter   Procedures    T4 free    TSH         Frieda Piper MD  Staff Endocrinologist    Division of  Endocrinology and Diabetes      Subjective:         HPI: Brandon Macias is a 48 year old male with history of graves disease who is here for a follow up.   Diagnosed with graves disease in 2016.   Lost for follow up for > 3 years due to insurance issues previously.    Currently on methimazole 2.5 mg daily   No Anxiety, emotional lability, weakness, tremor, heat intolerance, increased perspiration, and no weight loss.  No  palpitations   No Eye irritation, tearing. Or double vision.    No smoking  Family history unknown - not sure.    BB and anticoagulation by cardiology.     Allergies   Allergen Reactions    Salicylic Acid Rash       Current Outpatient Medications   Medication Sig Dispense Refill    methimazole (TAPAZOLE) 5 MG tablet Take 0.5 tablets (2.5 mg) by mouth daily 45 tablet 1    metoprolol tartrate (LOPRESSOR) 25 MG tablet Take 1 tablet (25 mg) by mouth 2 times daily 180 tablet 3    rivaroxaban ANTICOAGULANT (XARELTO) 20 MG TABS tablet Take 1 tablet (20 mg) by mouth daily (with dinner) 90 tablet 3       Review of Systems     11 point review system (Constitutional, HENT, Eyes, Respiratory, Cardiovascular, Gastrointestinal, Genitourinary, Musculoskeletal,Neurological, Psychiatric/Behavioural, Endocrine) is negative or is as per HPI above    Past Medical History:   Diagnosis Date    Graves disease      Past Surgical History:   Procedure Laterality Date    CV ASD CLOSURE      NO HISTORY OF SURGERY         Objective:   There were no vitals taken for this visit.    Constitutional: Pleasant no acute cardiopulmonary distress.   Psychological: appropriate mood and affect   In House Labs:   Lab Results   Component Value Date    A1C 5.1 11/18/2016       TSH   Date Value Ref Range Status   02/13/2023 2.20 0.40 - 4.00 mU/L Final   02/15/2022 <0.01 (L) 0.40 - 4.00 mU/L Final   11/30/2021 <0.01 (L) 0.40 - 4.00 mU/L Final   09/16/2021 <0.01 (L) 0.40 - 4.00 mU/L Final   07/30/2019 <0.01 (L) 0.40 - 4.00 mU/L Final   04/15/2019  <0.01 (L) 0.40 - 4.00 mU/L Final   02/17/2017 <0.01 (L) 0.40 - 4.00 mU/L Final   12/23/2016 <0.01 (L) 0.40 - 4.00 mU/L Final   11/25/2016 <0.01 (L) 0.40 - 4.00 mU/L Final     T4 Free   Date Value Ref Range Status   07/30/2019 1.96 (H) 0.76 - 1.46 ng/dL Final   04/15/2019 4.43 (H) 0.76 - 1.46 ng/dL Final   02/17/2017 0.85 0.76 - 1.46 ng/dL Final   12/23/2016 1.88 (H) 0.76 - 1.46 ng/dL Final   11/25/2016 2.56 (H) 0.76 - 1.46 ng/dL Final     Free T4   Date Value Ref Range Status   02/13/2023 0.77 0.76 - 1.46 ng/dL Final   02/15/2022 1.25 0.76 - 1.46 ng/dL Final   11/30/2021 2.32 (H) 0.76 - 1.46 ng/dL Final   09/16/2021 2.62 (H) 0.76 - 1.46 ng/dL Final       Creatinine   Date Value Ref Range Status   02/13/2023 1.09 0.66 - 1.25 mg/dL Final   07/30/2019 0.77 0.66 - 1.25 mg/dL Final   ]  US THYROID 2/15/2022 4:19 PM     COMPARISON: None     HISTORY: Hyperthyroidism; Graves disease     FINDINGS:   Thyroid parenchyma: Heterogeneous  The right lobe of the thyroid measures: 5.3 x 1.9 x 2.4 cm  The left lobe of the thyroid measures: 5.1 x 2.2 x 1.7 cm  The thyroid isthmus measures: 0.3 cm     No discrete thyroid nodules.                                                                       Impression:  Diffusely heterogeneous thyroid gland without discrete thyroid  nodules.     This note has been dictated using voice recognition software.  As a result, there may be errors in the documentation that have gone undetected.  Please consider this when interpreting information in this documentation.        Video-Visit Details    Type of service:  Video Visit  Joined the call at 8/14/2023, 11:30:53 am.  Left the call at 8/14/2023, 11:36:12 am.  You were on the call for 5 minutes 18 seconds .  Distant Location (provider location):  Off-site.     Platform used for Video Visit: [employer]

## 2023-08-14 NOTE — NURSING NOTE
Is the patient currently in the state of [place]? YES    Visit mode:VIDEO    If the visit is dropped, the patient can be reconnected by: VIDEO VISIT: Text to cell phone: [phone]    Will anyone else be joining the visit? NO      How would you like to obtain your AVS? MyChart    Are changes needed to the allergy or medication list? NO    Reason for visit: MAGNUS PRICE

## 2023-08-15 ENCOUNTER — TELEPHONE (OUTPATIENT)
Dept: ENDOCRINOLOGY | Facility: CLINIC | Age: 48
End: 2023-08-15

## 2023-08-15 DIAGNOSIS — E05.00 GRAVES DISEASE: ICD-10-CM

## 2023-08-15 RX ORDER — METHIMAZOLE 5 MG/1
2.5 TABLET ORAL DAILY
Qty: 45 TABLET | Refills: 1 | Status: SHIPPED | OUTPATIENT
Start: 2023-08-15 | End: 2024-07-15

## 2023-08-15 NOTE — TELEPHONE ENCOUNTER
Left Voicemail (1st Attempt) for the patient to call back and schedule the following:    Appointment type: return  Provider: dr. diaz  Return date: 8/14/2024  Specialty phone number: [phone]   Additonal Notes:  Return in about 8 months (around 4/14/2024)     Vee cervantes Procedure   Orthopedics, Podiatry, Sports Medicine, Ent ,Eye , Audiology, Adult Endocrine & Diabetes, Nutrition & Medication Therapy Management Specialties   [facility]

## 2023-08-21 LAB — TSI SER-ACNC: 2.4 TSI INDEX

## 2024-02-19 DIAGNOSIS — I63.332 CEREBROVASCULAR ACCIDENT (CVA) DUE TO THROMBOSIS OF LEFT POSTERIOR CEREBRAL ARTERY (H): ICD-10-CM

## 2024-02-19 DIAGNOSIS — I48.0 PAROXYSMAL A-FIB (H): ICD-10-CM

## 2024-02-19 RX ORDER — RIVAROXABAN 20 MG/1
20 TABLET, FILM COATED ORAL
Qty: 90 TABLET | Refills: 3 | Status: SHIPPED | OUTPATIENT
Start: 2024-02-19

## 2024-04-07 ENCOUNTER — HEALTH MAINTENANCE LETTER (OUTPATIENT)
Age: 49
End: 2024-04-07

## 2024-04-13 DIAGNOSIS — E05.00 GRAVES DISEASE: ICD-10-CM

## 2024-04-13 DIAGNOSIS — I48.0 PAROXYSMAL A-FIB (H): ICD-10-CM

## 2024-04-15 RX ORDER — METOPROLOL TARTRATE 25 MG/1
25 TABLET, FILM COATED ORAL 2 TIMES DAILY
Qty: 180 TABLET | Refills: 3 | Status: SHIPPED | OUTPATIENT
Start: 2024-04-15

## 2024-07-15 DIAGNOSIS — E05.00 GRAVES DISEASE: ICD-10-CM

## 2024-07-15 RX ORDER — METHIMAZOLE 5 MG/1
2.5 TABLET ORAL DAILY
Qty: 45 TABLET | Refills: 0 | Status: SHIPPED | OUTPATIENT
Start: 2024-07-15

## 2024-07-15 NOTE — TELEPHONE ENCOUNTER
methimazole (TAPAZOLE) 5 MG tablet 45 tablet 1 8/15/2023 -- No   Sig - Route: Take 0.5 tablets (2.5 mg) by mouth daily - Oral     ----------------------  Last Office Visit : 8/14/2023  [facility]      Future Office visit:     9/9/2024 1:00 PM (30 min)  Viktoria   RETURN ENDOCRINE    [facility] ([place]   Frieda Piper MD     ----------------------      Routing refill request to provider for review/approval because:  Requires provider authorization.

## 2024-09-09 ENCOUNTER — TELEPHONE (OUTPATIENT)
Dept: ENDOCRINOLOGY | Facility: CLINIC | Age: 49
End: 2024-09-09

## 2024-09-09 NOTE — TELEPHONE ENCOUNTER
Patient needs to be rescheduled for their virtual visit due to Reason for Reschedule: Patient Request- pt states he is out of town and forgot about the appt    Scheduling team, please refer to service line late cancellation/no-show policies and reach out to patient at a later date for rescheduling.    Appointment mode: Video  Provider: Frieda Piper

## 2024-09-11 ENCOUNTER — TELEPHONE (OUTPATIENT)
Dept: ENDOCRINOLOGY | Facility: CLINIC | Age: 49
End: 2024-09-11
Payer: COMMERCIAL

## 2024-09-11 NOTE — TELEPHONE ENCOUNTER
Patient confirmed scheduled appointment:  Date: 1/13   Time: 10:30  Visit type: return endocrine   Provider: Veronique   Location: [facility]  Testing/imaging:   Additional notes: Spoke to pt and re-alejandra to soonest avail appt due to missing appt on 9/9  Patient needs to be rescheduled for their virtual visit due to Reason for Reschedule: Patient Request- pt states he is out of town and forgot about the appt     Scheduling team, please refer to service line late cancellation/no-show policies and reach out to patient at a later date for rescheduling.     Appointment mode: Video  Provider: Frieda Peter on 9/11/2024 at 11:21 AM

## 2024-10-07 ENCOUNTER — OFFICE VISIT (OUTPATIENT)
Dept: FAMILY MEDICINE | Facility: CLINIC | Age: 49
End: 2024-10-07
Payer: COMMERCIAL

## 2024-10-07 ENCOUNTER — NURSE TRIAGE (OUTPATIENT)
Dept: FAMILY MEDICINE | Facility: CLINIC | Age: 49
End: 2024-10-07

## 2024-10-07 ENCOUNTER — TELEPHONE (OUTPATIENT)
Dept: CARDIOLOGY | Facility: CLINIC | Age: 49
End: 2024-10-07

## 2024-10-07 VITALS
OXYGEN SATURATION: 98 % | WEIGHT: 174 LBS | HEIGHT: 65 IN | SYSTOLIC BLOOD PRESSURE: 118 MMHG | DIASTOLIC BLOOD PRESSURE: 72 MMHG | BODY MASS INDEX: 28.99 KG/M2 | RESPIRATION RATE: 20 BRPM | TEMPERATURE: 97.9 F | HEART RATE: 51 BPM

## 2024-10-07 DIAGNOSIS — I63.332 CEREBROVASCULAR ACCIDENT (CVA) DUE TO THROMBOSIS OF LEFT POSTERIOR CEREBRAL ARTERY (H): ICD-10-CM

## 2024-10-07 DIAGNOSIS — E05.00 GRAVES DISEASE: ICD-10-CM

## 2024-10-07 DIAGNOSIS — Z13.29 SCREENING FOR THYROID DISORDER: ICD-10-CM

## 2024-10-07 DIAGNOSIS — I48.0 PAROXYSMAL A-FIB (H): ICD-10-CM

## 2024-10-07 DIAGNOSIS — R94.31 ABNORMAL ELECTROCARDIOGRAM: ICD-10-CM

## 2024-10-07 DIAGNOSIS — R00.1 BRADYCARDIA: ICD-10-CM

## 2024-10-07 DIAGNOSIS — R00.0 RACING HEART BEAT: Primary | ICD-10-CM

## 2024-10-07 LAB — TROPONIN T SERPL HS-MCNC: 12 NG/L

## 2024-10-07 PROCEDURE — 36415 COLL VENOUS BLD VENIPUNCTURE: CPT | Performed by: STUDENT IN AN ORGANIZED HEALTH CARE EDUCATION/TRAINING PROGRAM

## 2024-10-07 PROCEDURE — 99215 OFFICE O/P EST HI 40 MIN: CPT | Performed by: STUDENT IN AN ORGANIZED HEALTH CARE EDUCATION/TRAINING PROGRAM

## 2024-10-07 PROCEDURE — 84443 ASSAY THYROID STIM HORMONE: CPT | Performed by: STUDENT IN AN ORGANIZED HEALTH CARE EDUCATION/TRAINING PROGRAM

## 2024-10-07 PROCEDURE — 83735 ASSAY OF MAGNESIUM: CPT | Performed by: STUDENT IN AN ORGANIZED HEALTH CARE EDUCATION/TRAINING PROGRAM

## 2024-10-07 PROCEDURE — 84484 ASSAY OF TROPONIN QUANT: CPT | Performed by: STUDENT IN AN ORGANIZED HEALTH CARE EDUCATION/TRAINING PROGRAM

## 2024-10-07 PROCEDURE — 93000 ELECTROCARDIOGRAM COMPLETE: CPT | Performed by: STUDENT IN AN ORGANIZED HEALTH CARE EDUCATION/TRAINING PROGRAM

## 2024-10-07 PROCEDURE — 80048 BASIC METABOLIC PNL TOTAL CA: CPT | Performed by: STUDENT IN AN ORGANIZED HEALTH CARE EDUCATION/TRAINING PROGRAM

## 2024-10-07 NOTE — TELEPHONE ENCOUNTER
RN spoke to patient and his significant other.     Patient is not currently experiencing sx now. Patient experienced these sx most recent episode was Thursday and states sx lasted 1-2 hours. He reports pulse was at 160 and felt dizzy/faint. Patient was working () and states it was really warm. Patient states he drinks a lot of water and does not think he was dehydrated. Patient has hx of stroke on xarelto, hx of similar sx a long time ago and was put on metoprolol. Also had heart surgery to repair hole in heart.     Patient is feeling fine right now and they are on their way to clinic. They stated they just wanted to make sure patient is ok and ensure he doesn't need any medication adjustments.     Per protocol be seen today in clinic due to no longer experiencing sx. RN discussed ok to keep appointment in clinic since sx resolved but if they return he should be seen in ER.       Debby Fried RN on 10/7/2024 at 9:21 AM              Reason for Disposition   Heart beating very rapidly (e.g., > 140 / minute) and not present now  (Exception: During exercise.)    Additional Information   Negative: Passed out (i.e., lost consciousness, collapsed and was not responding)   Negative: Shock suspected (e.g., cold/pale/clammy skin, too weak to stand, low BP, rapid pulse)   Negative: Difficult to awaken or acting confused (e.g., disoriented, slurred speech)   Negative: Visible sweat on face or sweat dripping down face   Negative: Unable to walk, or can only walk with assistance (e.g., requires support)   Negative: Received SHOCK from implantable cardiac defibrillator and has persisting symptoms (i.e., palpitations, lightheadedness)   Negative: Dizziness, lightheadedness, or weakness and heart beating very rapidly (e.g., > 140 / minute)   Negative: Dizziness, lightheadedness, or weakness and heart beating very slowly (e.g., < 50 / minute)   Negative: Sounds like a life-threatening emergency to the triager    "Negative: Chest pain   Negative: Difficulty breathing   Negative: Dizziness, lightheadedness, or weakness   Negative: Heart beating very rapidly (e.g., > 140 / minute) and present now  (Exception: During exercise.)   Negative: Heart beating very slowly (e.g., < 50 / minute)  (Exception: Athlete and heart rate normal for caller.)   Negative: New or worsened shortness of breath with activity (dyspnea on exertion)   Negative: Patient sounds very sick or weak to the triager   Negative: Wearing a 'Holter monitor' or 'cardiac event monitor'   Negative: Received SHOCK from implantable cardiac defibrillator (and now feels well)    Answer Assessment - Initial Assessment Questions  1. DESCRIPTION: \"Please describe your heart rate or heartbeat that you are having\" (e.g., fast/slow, regular/irregular, skipped or extra beats, \"palpitations\")      Heart beating at 160, faster than usual.   2. ONSET: \"When did it start?\" (Minutes, hours or days)       Approx Thursday, comes and goes.   3. DURATION: \"How long does it last\" (e.g., seconds, minutes, hours)      Recent episode on Thursday 1-2 hours   4. PATTERN \"Does it come and go, or has it been constant since it started?\"  \"Does it get worse with exertion?\"   \"Are you feeling it now?\"      Comes goes, last episode Thursday evening. Patient was working and it was hot. Patient was drinking a lot of water   5. TAP: \"Using your hand, can you tap out what you are feeling on a chair or table in front of you, so that I can hear?\" (Note: not all patients can do this)        N/A   6. HEART RATE: \"Can you tell me your heart rate?\" \"How many beats in 15 seconds?\"  (Note: not all patients can do this)        Patient not experiencing now   7. RECURRENT SYMPTOM: \"Have you ever had this before?\" If Yes, ask: \"When was the last time?\" and \"What happened that time?\"       Patient experienced this a long time ago, and states they gave him some medication metoprolol   8. CAUSE: \"What do you think is " "causing the palpitations?\"      Unsure   9. CARDIAC HISTORY: \"Do you have any history of heart disease?\" (e.g., heart attack, angina, bypass surgery, angioplasty, arrhythmia)       Patient had a hole in his heart and surgery 2019, and stroke, on xarelto   10. OTHER SYMPTOMS: \"Do you have any other symptoms?\" (e.g., dizziness, chest pain, sweating, difficulty breathing)        Patient not currently experiencing but when he did felt faint/dizzy   11. PREGNANCY: \"Is there any chance you are pregnant?\" \"When was your last menstrual period?\"        NO    Protocols used: Heart Rate and Heartbeat [id number]    "

## 2024-10-07 NOTE — PATIENT INSTRUCTIONS
Brian Taylor,    Thank you for allowing [facility] to manage your care.    I ordered some blood work, please go to the laboratory to get your laboratory studies.    I ordered a echo , please call diagnostic imaging [phone] to schedule your test.    I made a referral, they will be calling in approximately 1 week to set up your appointment.  If you do not hear from them, please call the specialty number on your after visit.     For your convenience, test results are released as soon as they are available  Please allow 1-2 business days for me to send you a comment about your results.  If not done so, I encourage you to login into [employer] [email or web address] to review your results in real time.     If you have any questions or concerns, please feel free to call us at [phone].    Sincerely,    Dr. Brock    Did you know?      You can schedule a video visit for follow-up appointments as well as future appointments for certain conditions.  Please see the below link.     [email or web address]    If you have not already done so,  I encourage you to sign up for [email or web address] ([email or web address]).  This will allow you to review your results, securely communicate with a provider, and schedule virtual visits as well.

## 2024-10-07 NOTE — PROGRESS NOTES
Assessment & Plan   A 49-year-old male with a history of Graves' disease, CVA, ASD, and paroxysmal atrial fibrillation presents tonight with one episode of tachycardia. He has since returned to his baseline state. The EKG shows sinus bradycardia, which was also noted in 2022. A review of the medical records indicates that the patient experienced a large left cerebral CVA in 2019, at which point he was diagnosed with paroxysmal atrial fibrillation and ASD. Transcatheter closure of the ASD was performed at that time. He has not seen a cardiologist since February 7, 2022.  Racing heart beat    - EKG 12-lead complete w/read - Clinics    Bradycardia    - Adult Cardiology Eval  Referral; Future  - Echocardiogram Complete; Future  - TSH with free T4 reflex; Future  - Basic metabolic panel  (Ca, Cl, CO2, Creat, Gluc, K, Na, BUN); Future  - Magnesium; Future  - Troponin T, High Sensitivity; Future  - ZIO PATCH MAIL OUT; Future  - TSH with free T4 reflex  - Basic metabolic panel  (Ca, Cl, CO2, Creat, Gluc, K, Na, BUN)  - Magnesium  - Troponin T, High Sensitivity    Paroxysmal A-fib (H)    - Adult Cardiology Eval  Referral; Future  - Echocardiogram Complete; Future  - TSH with free T4 reflex; Future  - Basic metabolic panel  (Ca, Cl, CO2, Creat, Gluc, K, Na, BUN); Future  - Magnesium; Future  - Troponin T, High Sensitivity; Future  - ZIO PATCH MAIL OUT; Future  - TSH with free T4 reflex  - Basic metabolic panel  (Ca, Cl, CO2, Creat, Gluc, K, Na, BUN)  - Magnesium  - Troponin T, High Sensitivity    Abnormal electrocardiogram    - Adult Cardiology Eval  Referral; Future  - Echocardiogram Complete; Future  - TSH with free T4 reflex; Future  - Basic metabolic panel  (Ca, Cl, CO2, Creat, Gluc, K, Na, BUN); Future  - Magnesium; Future  - Troponin T, High Sensitivity; Future  - ZIO PATCH MAIL OUT; Future  - TSH with free T4 reflex  - Basic metabolic panel  (Ca, Cl, CO2, Creat, Gluc, K, Na, BUN)  -  "Magnesium  - Troponin T, High Sensitivity    Screening for thyroid disorder    - TSH with free T4 reflex; Future  - TSH with free T4 reflex    Graves disease      Cerebrovascular accident (CVA) due to thrombosis of left posterior cerebral artery (H)            BMI  Estimated body mass index is 28.96 kg/m  as calculated from the following:    Height as of this encounter: 1.651 m (5' 5\").    Weight as of this encounter: 78.9 kg (174 lb).   Weight management plan: Discussed healthy diet and exercise guidelines            40 minutes spent by me on the date of the encounter doing chart review, history and exam, documentation and further activities per the note    Jeanne Taylor is a 49 year old, presenting for the following health issues:  Heart Problem      10/7/2024    10:00 AM   Additional Questions   Roomed by Linda WAY         10/7/2024    10:00 AM   Patient Reported Additional Medications   Patient reports taking the following new medications No new medications to add     Heart Problem    History of Present Illness       Reason for visit:  Heart beating fast  : has issues with this in the past but has been taking medication to control this, but mostly recently started back up again on Thursday.  Symptom progression:  Staying the same  Had these symptoms before:  Yes    He eats 0-1 servings of fruits and vegetables daily.He consumes 1 sweetened beverage(s) daily.He exercises with enough effort to increase his heart rate 60 or more minutes per day.  He exercises with enough effort to increase his heart rate 4 days per week.   He is taking medications regularly.     \"TRIAGE NOTE    Patient is not currently experiencing sx now. Patient experienced these sx most recent episode was Thursday and states sx lasted 1-2 hours. He reports pulse was at 160 and felt dizzy/faint. Patient was working () and states it was really warm. Patient states he drinks a lot of water and does not think he was dehydrated. " "Patient has hx of stroke on xarelto, hx of similar sx a long time ago and was put on metoprolol. Also had heart surgery to repair hole in heart.      Patient is feeling fine right now and they are on their way to clinic. They stated they just wanted to make sure patient is ok and ensure he doesn't need any medication adjustments.      Per protocol be seen today in clinic due to no longer experiencing sx. RN discussed ok to keep appointment in clinic since sx resolved but if they return he should be seen in ER    Review of Systems  Constitutional, neuro, ENT, endocrine, pulmonary, cardiac, gastrointestinal, genitourinary, musculoskeletal, integument and psychiatric systems are negative, except as otherwise noted.      Objective    /72 (BP Location: Left arm, Patient Position: Sitting, Cuff Size: Adult Regular)   Pulse 51   Temp 97.9  F (36.6  C) (Oral)   Resp 20   Ht 1.651 m (5' 5\")   Wt 78.9 kg (174 lb)   SpO2 98%   BMI 28.96 kg/m    Body mass index is 28.96 kg/m .  Physical Exam   GENERAL: alert and no distress  RESP: lungs clear to auscultation - no rales, rhonchi or wheezes  CV: Bradycardic and rhythm, normal S1 S2,   no peripheral edema  MS: no gross musculoskeletal defects noted, no edema            Signed Electronically by: Jane Brock MD    "

## 2024-10-07 NOTE — TELEPHONE ENCOUNTER
This encounter is being sent to inform the clinic that this patient has a referral from     Jane Brock MD    for the diagnoses of   Bradycardia [R00.1]; Paroxysmal A-fib (H) [I48.0]; Abnormal electrocardiogram [R94.31] and has requested that this patient be seen within 1-2 days and/or with any providers.  Based on the availability of our provider(s), we are unable to accommodate this request.    Were all sites offered this patient?  Yes    Does scheduling algorithm request to schedule next available?  Patient has been scheduled for the first available opening with Dr. Garza on 11/25/24.  We have informed the patient that the clinic will review their referral and reach out if a sooner appointment is medically necessary.      Patient scheduled as above but per referral, he needs to get in 1-2 days. Please assist patient and he scheduled for an ECHO on 10/21/24 at [facility].

## 2024-10-07 NOTE — TELEPHONE ENCOUNTER
Called and spoke with patient. Offered sooner appointment on 10/24/24 with Dr. Whitaker. Patient declined as he needs it to be on a Monday. Offered appointment on 11/18/24 with Dr. Garza at [facility] at 8am. Patient agreeable. Added patient to waitlist.     Kayley Ruiz, RN, BSN  Cardiology RN Care Coordinator   [place]   Phone: [phone]  Fax: [phone] ([place]) [phone] ([place])

## 2024-10-08 ENCOUNTER — ORDERS ONLY (AUTO-RELEASED) (OUTPATIENT)
Dept: FAMILY MEDICINE | Facility: CLINIC | Age: 49
End: 2024-10-08
Payer: COMMERCIAL

## 2024-10-08 DIAGNOSIS — R00.1 BRADYCARDIA: ICD-10-CM

## 2024-10-08 DIAGNOSIS — R94.31 ABNORMAL ELECTROCARDIOGRAM: ICD-10-CM

## 2024-10-08 DIAGNOSIS — I48.0 PAROXYSMAL A-FIB (H): ICD-10-CM

## 2024-10-08 LAB
ANION GAP SERPL CALCULATED.3IONS-SCNC: 11 MMOL/L (ref 7–15)
BUN SERPL-MCNC: 17 MG/DL (ref 6–20)
CALCIUM SERPL-MCNC: 8.9 MG/DL (ref 8.8–10.4)
CHLORIDE SERPL-SCNC: 105 MMOL/L (ref 98–107)
CREAT SERPL-MCNC: 0.71 MG/DL (ref 0.67–1.17)
EGFRCR SERPLBLD CKD-EPI 2021: >90 ML/MIN/1.73M2
GLUCOSE SERPL-MCNC: 87 MG/DL (ref 70–99)
HCO3 SERPL-SCNC: 23 MMOL/L (ref 22–29)
MAGNESIUM SERPL-MCNC: 2.2 MG/DL (ref 1.7–2.3)
POTASSIUM SERPL-SCNC: 4.3 MMOL/L (ref 3.4–5.3)
SODIUM SERPL-SCNC: 139 MMOL/L (ref 135–145)
TSH SERPL DL<=0.005 MIU/L-ACNC: 0.34 UIU/ML (ref 0.3–4.2)

## 2024-11-09 PROCEDURE — 93227 XTRNL ECG REC<48 HR R&I: CPT | Performed by: INTERNAL MEDICINE

## 2025-01-05 NOTE — PROGRESS NOTES
[facility]      Referring provider:Jane Brock MD       HPI: Mr. Brandon Macias is a 49 year old  male with PMH significant for    - Graves' disease  - CVA 2019  - ASD s/p transcatheter closure 2019  - Paroxysmal atrial fibrillation      Past cardiac history:   Patient was admitted on 4/24/2019 with a large left cerebral CVA. An event monitor on 5/17/2019 showed multiple SVT episodes as well as paroxysmal atrial fibrillation episodes. Transesophageal echocardiogram 4/25/2019 showed ostium secundum ASD with enlarged right ventricle.  A transthoracic echocardiogram done on 4/24/2019 at [facility] also showed enlarged right-sided heart chambers.  Patient underwent successful transcatheter closure of his ASD with a [place] cardioform septal occluder size 30 device on 7/31/2019.     Today's visit:  He says he is overall feeling well. He works as a , work involves lifting heavy loads and he is able to do that without symptoms. Does not report chest pain or shortness of breath. Said he had one episode a few months ago of light-headedness and palpitations which happened on a hot day while driving, resolved with taking one his scheduled doses of meds. Does not report palpitations or lightheadedness since then. Drinks 1-2 cans of beer on the weekend. Quit smoking 30 years ago. No other drug use.     Current medications: Methimazole, metoprolol,    Medications, personal, family, and social history reviewed with patient and revised.    PAST MEDICAL HISTORY:  Past Medical History:   Diagnosis Date    Graves disease        CURRENT MEDICATIONS:  Current Outpatient Medications   Medication Sig Dispense Refill    methimazole (TAPAZOLE) 5 MG tablet TAKE 1/2 TABLET(2.5 MG) BY MOUTH DAILY 45 tablet 0    metoprolol tartrate (LOPRESSOR) 25 MG tablet Take 1 tablet (25 mg) by mouth 2 times daily 180 tablet  3    XARELTO ANTICOAGULANT 20 MG TABS tablet TAKE 1 TABLET(20 MG) BY MOUTH DAILY WITH DINNER 90 tablet 3       PAST SURGICAL HISTORY:  Past Surgical History:   Procedure Laterality Date    CV ASD CLOSURE      NO HISTORY OF SURGERY         ALLERGIES:     Allergies   Allergen Reactions    Salicylic Acid Rash       FAMILY HISTORY:  Family History   Problem Relation Age of Onset    Family History Negative Other          SOCIAL HISTORY:  Social History     Tobacco Use    Smoking status: Never    Smokeless tobacco: Never   Substance Use Topics    Alcohol use: Yes    Drug use: No       ROS:   Constitutional: No fever, chills, or sweats. Weight stable.   Cardiovascular: As per HPI.       Exam:  /80 (BP Location: Left arm, Patient Position: Chair, Cuff Size: Adult Regular)   Pulse 54   Wt 80.7 kg (178 lb)   SpO2 98%   BMI 29.62 kg/m    GENERAL APPEARANCE: alert and no distress  HEENT: no icterus, no central cyanosis  LYMPH/NECK: no adenopathy, no asymmetry, JVP not elevated, no carotid bruits.  RESPIRATORY: lungs clear to auscultation - no rales, rhonchi or wheezes, no use of accessory muscles, no retractions, respirations are unlabored, normal respiratory rate  CARDIOVASCULAR: regular rhythm, normal S1, S2, no S3 or S4 and no murmur, click or rub, precordium quiet with normal PMI.  GI: soft, non tender  EXTREMITIES: peripheral pulses normal, no edema  NEURO: alert, normal speech,and affect  SKIN: no ecchymoses, no rashes     I have reviewed the labs and personally reviewed the imaging below and made my comment in the assessment and plan.    Labs:  CBC RESULTS:   Lab Results   Component Value Date    WBC 6.5 09/16/2021    WBC 8.1 07/30/2019    RBC 5.38 09/16/2021    RBC 5.41 07/30/2019    HGB 16.0 09/16/2021    HGB 17.2 07/30/2019    HCT 46.6 09/16/2021    HCT 49.0 07/30/2019    MCV 87 09/16/2021    MCV 91 07/30/2019    MCH 29.7 09/16/2021    MCH 31.8 07/30/2019    MCHC 34.3 09/16/2021    MCHC 35.1 07/30/2019     RDW 12.4 09/16/2021    RDW 12.8 07/30/2019     09/16/2021     07/30/2019       BMP RESULTS:  Lab Results   Component Value Date     10/07/2024     04/15/2019    POTASSIUM 4.3 10/07/2024    POTASSIUM 3.8 02/13/2023    POTASSIUM 3.8 07/30/2019    CHLORIDE 105 10/07/2024    CHLORIDE 107 02/13/2023    CHLORIDE 107 04/15/2019    CO2 23 10/07/2024    CO2 26 02/13/2023    CO2 23 04/15/2019    ANIONGAP 11 10/07/2024    ANIONGAP 7 02/13/2023    ANIONGAP 11 04/15/2019    GLC 87 10/07/2024    GLC 94 02/13/2023    GLC 95 07/30/2019    BUN 17.0 10/07/2024    BUN 20 02/13/2023    BUN 13 04/15/2019    CR 0.71 10/07/2024    CR 0.77 07/30/2019    GFRESTIMATED >90 10/07/2024    GFRESTIMATED >90 07/30/2019    GFRESTBLACK >90 07/30/2019    TARA 8.9 10/07/2024    TARA 8.9 04/15/2019      Zio patch 11/2024: No significant cardiac arrhythmia.  Symptomatic episodes corresponded to sinus rhythm.    Echocardiogram 2019  Global and regional left ventricular function is normal with an EF of 60-65%.  Global right ventricular function is normal.  No significant valvular abnormalities were noted.     No pericardial effusion is present.  Pulmonary artery systolic pressure is normal.    ZIO monitor 5/2019:  1. Basic rhythm is Normal Sinus Rhythm.   2. Atrial fibrillation occcurred with the longest lasting less than 2   minutes. Rare PACs and PVCs with short runs.   3. No symptoms were reported     Assessment and Plan:     # Graves' disease  # CVA 2019  # ASD s/p transcatheter closure 2019  # Paroxysmal atrial fibrillation.    -Documented in 2019 via Zio patch.  He has been on Xarelto and metoprolol since.  No side effects from the medications.  # Palpitations:   -Single episode few months ago that self resolved.  No EKG documentation.  Lasted for 2 hours.  Good exercise tolerance. Given that he has had a stroke with known history of paroxysmal A-fib this would warrant lifelong AC. He has been feeling well for years now on  this dose of metoprolol so will continue it without changes. Does not require repeat ziopatch at this time given asymptomatic status.    Plan:  -Continue metoprolol 25 mg BID  -Continue Xarelto  -Will reorder echo to assess given history of ASD closure.     No medication changes today.  Follow-up in 1 years, or earlier if new symptoms or significant echo findings noted.      Dr Jojo Pop Fellow PGY 5    Attending note:     I, Tabatha Almaguer MD, saw this patient with the resident and agree with the resident/fellow's findings and plan of care as documented in the note.       I personally reviewed vital signs, medications, labs, imaging, and ECG .     The history and physical findings are accurate as recorded. My additional findings, if any, have been incorporated into the body of the note. The assessment and plans outlined reflect our joint decision making.     Total time spent 35 minutes including precharting, face-to-face clinic visit and medical documentation.     Date of service (when I saw the patient): 1/6/2025   Tabatah ALMAGUER MD  [facility] Division of Cardiology  Pager [phone]

## 2025-01-06 ENCOUNTER — OFFICE VISIT (OUTPATIENT)
Dept: CARDIOLOGY | Facility: CLINIC | Age: 50
End: 2025-01-06
Attending: STUDENT IN AN ORGANIZED HEALTH CARE EDUCATION/TRAINING PROGRAM
Payer: COMMERCIAL

## 2025-01-06 VITALS
BODY MASS INDEX: 29.62 KG/M2 | WEIGHT: 178 LBS | DIASTOLIC BLOOD PRESSURE: 80 MMHG | SYSTOLIC BLOOD PRESSURE: 138 MMHG | HEART RATE: 54 BPM | OXYGEN SATURATION: 98 %

## 2025-01-06 DIAGNOSIS — R00.1 BRADYCARDIA: ICD-10-CM

## 2025-01-06 DIAGNOSIS — I48.0 PAROXYSMAL A-FIB (H): ICD-10-CM

## 2025-01-06 DIAGNOSIS — Z87.74 HX OF PERCUTANEOUS TRANSCATHETER CLOSURE OF CONGENITAL ASD: Primary | ICD-10-CM

## 2025-01-06 DIAGNOSIS — Z86.73 HISTORY OF CVA (CEREBROVASCULAR ACCIDENT): ICD-10-CM

## 2025-01-06 NOTE — PATIENT INSTRUCTIONS
Thank you for coming to the [facility] @ [facility]; please note the following instructions:    1. Echocardiogram    2. No medication changes today    3. Recommendation to avoid alcohol    4. Follow up with Dr. Garza in 1 year    5. If you have further episodes of chest pain, please update clinic at # [phone] or by [email or web address]        If you have any questions regarding your visit please contact your care team:     Cardiology  Telephone Number   Rosemary COLE., RN  Kayley BELLAMY, RN  Jayna WORTHY, RN  Jenny MAJANO, JERRY BANKS, JERRY MERINO, Clinic Facilitator  Letty BELLAMY, Clinic Facilitator [phone] (option 1)   For scheduling appts:     [phone] (select option 1)       For the Device Clinic (Pacemakers and ICD's)  RN's :  Nica Flanagan   During business hours: [phone]    *After business hours:  [phone] (select option 4)      Normal test result notifications will be released via [employer] or mailed within 7 business days.  All other test results, will be communicated via telephone once reviewed by your cardiologist.    If you need a medication refill please contact your pharmacy.  Please allow 3 business days for your refill to be completed.    As always, thank you for trusting us with your health care needs!

## 2025-01-06 NOTE — NURSING NOTE
"Chief Complaint   Patient presents with    Abnormal Ekg    Atrial Fib    Bradycardia       Initial /80 (BP Location: Left arm, Patient Position: Chair, Cuff Size: Adult Regular)   Pulse 54   Wt 80.7 kg (178 lb)   SpO2 98%   BMI 29.62 kg/m   Estimated body mass index is 29.62 kg/m  as calculated from the following:    Height as of 10/7/24: 1.651 m (5' 5\").    Weight as of this encounter: 80.7 kg (178 lb)..  BP completed using cuff size: regular    Letty Stokes, Visit Facilitator    "

## 2025-01-06 NOTE — LETTER
1/6/2025      RE: Brandon Macias  [address]  [facility] [address]       Dear Colleague,    Thank you for the opportunity to participate in the care of your patient, Brandon Macias, at the [facility]. Please see a copy of my visit note below.                                                                                         General Cardiology Clinic-[place]      Referring provider:Jane Brock MD       HPI: Mr. Brandon Macias is a 49 year old  male with PMH significant for    - Graves' disease  - CVA 2019  - ASD s/p transcatheter closure 2019  - Paroxysmal atrial fibrillation      Past cardiac history:   Patient was admitted on 4/24/2019 with a large left cerebral CVA. An event monitor on 5/17/2019 showed multiple SVT episodes as well as paroxysmal atrial fibrillation episodes. Transesophageal echocardiogram 4/25/2019 showed ostium secundum ASD with enlarged right ventricle.  A transthoracic echocardiogram done on 4/24/2019 at [facility] also showed enlarged right-sided heart chambers.  Patient underwent successful transcatheter closure of his ASD with a [place] cardioform septal occluder size 30 device on 7/31/2019.     Today's visit:  He says he is overall feeling well. He works as a , work involves lifting heavy loads and he is able to do that without symptoms. Does not report chest pain or shortness of breath. Said he had one episode a few months ago of light-headedness and palpitations which happened on a hot day while driving, resolved with taking one his scheduled doses of meds. Does not report palpitations or lightheadedness since then. Drinks 1-2 cans of beer on the weekend. Quit smoking 30 years ago. No other drug use.     Current medications: Methimazole, metoprolol,    Medications, personal, family, and social history reviewed with patient and revised.    PAST MEDICAL HISTORY:  Past Medical  History:   Diagnosis Date     Graves disease        CURRENT MEDICATIONS:  Current Outpatient Medications   Medication Sig Dispense Refill     methimazole (TAPAZOLE) 5 MG tablet TAKE 1/2 TABLET(2.5 MG) BY MOUTH DAILY 45 tablet 0     metoprolol tartrate (LOPRESSOR) 25 MG tablet Take 1 tablet (25 mg) by mouth 2 times daily 180 tablet 3     XARELTO ANTICOAGULANT 20 MG TABS tablet TAKE 1 TABLET(20 MG) BY MOUTH DAILY WITH DINNER 90 tablet 3       PAST SURGICAL HISTORY:  Past Surgical History:   Procedure Laterality Date     CV ASD CLOSURE       NO HISTORY OF SURGERY         ALLERGIES:     Allergies   Allergen Reactions     Salicylic Acid Rash       FAMILY HISTORY:  Family History   Problem Relation Age of Onset     Family History Negative Other          SOCIAL HISTORY:  Social History     Tobacco Use     Smoking status: Never     Smokeless tobacco: Never   Substance Use Topics     Alcohol use: Yes     Drug use: No       ROS:   Constitutional: No fever, chills, or sweats. Weight stable.   Cardiovascular: As per HPI.       Exam:  /80 (BP Location: Left arm, Patient Position: Chair, Cuff Size: Adult Regular)   Pulse 54   Wt 80.7 kg (178 lb)   SpO2 98%   BMI 29.62 kg/m    GENERAL APPEARANCE: alert and no distress  HEENT: no icterus, no central cyanosis  LYMPH/NECK: no adenopathy, no asymmetry, JVP not elevated, no carotid bruits.  RESPIRATORY: lungs clear to auscultation - no rales, rhonchi or wheezes, no use of accessory muscles, no retractions, respirations are unlabored, normal respiratory rate  CARDIOVASCULAR: regular rhythm, normal S1, S2, no S3 or S4 and no murmur, click or rub, precordium quiet with normal PMI.  GI: soft, non tender  EXTREMITIES: peripheral pulses normal, no edema  NEURO: alert, normal speech,and affect  SKIN: no ecchymoses, no rashes     I have reviewed the labs and personally reviewed the imaging below and made my comment in the assessment and plan.    Labs:  CBC RESULTS:   Lab Results    Component Value Date    WBC 6.5 09/16/2021    WBC 8.1 07/30/2019    RBC 5.38 09/16/2021    RBC 5.41 07/30/2019    HGB 16.0 09/16/2021    HGB 17.2 07/30/2019    HCT 46.6 09/16/2021    HCT 49.0 07/30/2019    MCV 87 09/16/2021    MCV 91 07/30/2019    MCH 29.7 09/16/2021    MCH 31.8 07/30/2019    MCHC 34.3 09/16/2021    MCHC 35.1 07/30/2019    RDW 12.4 09/16/2021    RDW 12.8 07/30/2019     09/16/2021     07/30/2019       BMP RESULTS:  Lab Results   Component Value Date     10/07/2024     04/15/2019    POTASSIUM 4.3 10/07/2024    POTASSIUM 3.8 02/13/2023    POTASSIUM 3.8 07/30/2019    CHLORIDE 105 10/07/2024    CHLORIDE 107 02/13/2023    CHLORIDE 107 04/15/2019    CO2 23 10/07/2024    CO2 26 02/13/2023    CO2 23 04/15/2019    ANIONGAP 11 10/07/2024    ANIONGAP 7 02/13/2023    ANIONGAP 11 04/15/2019    GLC 87 10/07/2024    GLC 94 02/13/2023    GLC 95 07/30/2019    BUN 17.0 10/07/2024    BUN 20 02/13/2023    BUN 13 04/15/2019    CR 0.71 10/07/2024    CR 0.77 07/30/2019    GFRESTIMATED >90 10/07/2024    GFRESTIMATED >90 07/30/2019    GFRESTBLACK >90 07/30/2019    TARA 8.9 10/07/2024    TARA 8.9 04/15/2019      Zio patch 11/2024: No significant cardiac arrhythmia.  Symptomatic episodes corresponded to sinus rhythm.    Echocardiogram 2019  Global and regional left ventricular function is normal with an EF of 60-65%.  Global right ventricular function is normal.  No significant valvular abnormalities were noted.     No pericardial effusion is present.  Pulmonary artery systolic pressure is normal.    ZIO monitor 5/2019:  1. Basic rhythm is Normal Sinus Rhythm.   2. Atrial fibrillation occcurred with the longest lasting less than 2   minutes. Rare PACs and PVCs with short runs.   3. No symptoms were reported     Assessment and Plan:     # Graves' disease  # CVA 2019  # ASD s/p transcatheter closure 2019  # Paroxysmal atrial fibrillation.    -Documented in 2019 via Zio patch.  He has been on Xarelto and  metoprolol since.  No side effects from the medications.  # Palpitations:   -Single episode few months ago that self resolved.  No EKG documentation.  Lasted for 2 hours.  Good exercise tolerance. Given that he has had a stroke with known history of paroxysmal A-fib this would warrant lifelong AC. He has been feeling well for years now on this dose of metoprolol so will continue it without changes. Does not require repeat ziopatch at this time given asymptomatic status.    Plan:  -Continue metoprolol 25 mg BID  -Continue Xarelto  -Will reorder echo to assess given history of ASD closure.     No medication changes today.  Follow-up in 1 years, or earlier if new symptoms or significant echo findings noted.    Attending note:     I, Tabatha Almaguer MD, saw this patient with the resident and agree with the resident/fellow's findings and plan of care as documented in the note.       I personally reviewed vital signs, medications, labs, imaging, and ECG .     The history and physical findings are accurate as recorded. My additional findings, if any, have been incorporated into the body of the note. The assessment and plans outlined reflect our joint decision making.     Total time spent 35 minutes including precharting, face-to-face clinic visit and medical documentation.     Date of service (when I saw the patient): 12/18/2024     Tabatha ALMAGUER MD  [facility] Division of Cardiology  Pager [phone]           Please do not hesitate to contact me if you have any questions/concerns.     Sincerely,     Tabatha Almaguer MD

## 2025-01-13 ENCOUNTER — VIRTUAL VISIT (OUTPATIENT)
Dept: ENDOCRINOLOGY | Facility: CLINIC | Age: 50
End: 2025-01-13
Payer: COMMERCIAL

## 2025-01-13 VITALS — WEIGHT: 180 LBS | HEIGHT: 65 IN | BODY MASS INDEX: 29.99 KG/M2

## 2025-01-13 DIAGNOSIS — E05.90 HYPERTHYROIDISM: ICD-10-CM

## 2025-01-13 DIAGNOSIS — E05.00 GRAVES DISEASE: Primary | ICD-10-CM

## 2025-01-13 PROCEDURE — G2211 COMPLEX E/M VISIT ADD ON: HCPCS | Mod: 95 | Performed by: INTERNAL MEDICINE

## 2025-01-13 PROCEDURE — 98006 SYNCH AUDIO-VIDEO EST MOD 30: CPT | Performed by: INTERNAL MEDICINE

## 2025-01-13 RX ORDER — METHIMAZOLE 5 MG/1
2.5 TABLET ORAL DAILY
Qty: 45 TABLET | Refills: 0 | Status: SHIPPED | OUTPATIENT
Start: 2025-01-13

## 2025-01-13 ASSESSMENT — PAIN SCALES - GENERAL: PAINLEVEL_OUTOF10: NO PAIN (0)

## 2025-01-13 NOTE — PATIENT INSTRUCTIONS
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 This means you have a thyroid gland that makes too much thyroid hormone.     Graves' disease is treated by taking a medication like methimazole as you are currently doing. Please continue to take this medication at the dose indicated below.   Methimazole is usually well tolerated and safe that it has a rare and serious side effects.  are and serious side effects are  1.  Agranulocytosis: this is a loss of the white cell count that fights an infection.  This occurs approximately 1 in 200 people  2. liver problems this is even more rare than a granulocytosis but it can be very serious   If you develop the following symptoms while taking methimazole please notify your doctor immediately: fever, signs of infection such as sore throat: Or flulike illness, nausea, vomiting, abdominal pain.  any time you have an infection please have your complete blood count checked to make sure that your white cell count is stable.        Please check blood work today and we will adjust the dose of methimazole based on the results.  Please check thyroid blood work every 3 months as long as you are on methimazole treatment

## 2025-01-13 NOTE — PROGRESS NOTES
Endocrinology Clinic Visit    Chief Complaint: RECHECK     Information obtained from:Patient      Assessment/Treatment Plan:      Graves disease   Hyperthyroidism     Diagnosed in 2016.  On methimazole 2.5 mg daily. Other modalities of therapy including I131 discussed. He would like to continue with methimazole for now.   Clinically euthyroid.   Plan:      Methimazole 2.5 mg daily.   Check thyroid blood work today.     Test and/or medications prescribed today:  Orders Placed This Encounter   Procedures    T4 free    TSH    Thyroid stimulating immunoglobulin         Frieda Piper MD  Staff Endocrinologist    Division of Endocrinology and Diabetes      Subjective:         HPI: Brandon Macias is a 49 year old male with history of graves disease who is here for a follow up.   Diagnosed with graves disease in 2016.   Lost for follow up for > 3 years due to insurance issues previously.    Currently on methimazole 2.5 mg daily   No Anxiety, emotional lability, weakness, tremor, heat intolerance, increased perspiration, and no weight loss.  No  palpitations   No Eye irritation, tearing. Or double vision.    No smoking  Family history unknown - not sure.    BB and anticoagulation by cardiology.     Allergies   Allergen Reactions    Salicylic Acid Rash       Current Outpatient Medications   Medication Sig Dispense Refill    methimazole (TAPAZOLE) 5 MG tablet TAKE 1/2 TABLET(2.5 MG) BY MOUTH DAILY 45 tablet 0    metoprolol tartrate (LOPRESSOR) 25 MG tablet Take 1 tablet (25 mg) by mouth 2 times daily 180 tablet 3    XARELTO ANTICOAGULANT 20 MG TABS tablet TAKE 1 TABLET(20 MG) BY MOUTH DAILY WITH DINNER 90 tablet 3       Review of Systems     8 point review system (Constitutional, HENT, Eyes, Respiratory, Cardiovascular, Gastrointestinal, Genitourinary, Musculoskeletal,Neurological, Psychiatric/Behavioural, Endocrine) is negative or is as per HPI above    Past Medical History:   Diagnosis Date    Graves disease      Past  "Surgical History:   Procedure Laterality Date    CV ASD CLOSURE      NO HISTORY OF SURGERY         Objective:   Ht 1.651 m (5' 5\")   Wt 81.6 kg (180 lb)   BMI 29.95 kg/m      Constitutional: Pleasant no acute cardiopulmonary distress.   Psychological: appropriate mood and affect   In House Labs:   Lab Results   Component Value Date    A1C 5.1 11/18/2016       TSH   Date Value Ref Range Status   10/07/2024 0.34 0.30 - 4.20 uIU/mL Final   08/14/2023 2.01 0.30 - 4.20 uIU/mL Final   02/13/2023 2.20 0.40 - 4.00 mU/L Final   02/15/2022 <0.01 (L) 0.40 - 4.00 mU/L Final   11/30/2021 <0.01 (L) 0.40 - 4.00 mU/L Final   09/16/2021 <0.01 (L) 0.40 - 4.00 mU/L Final   07/30/2019 <0.01 (L) 0.40 - 4.00 mU/L Final   04/15/2019 <0.01 (L) 0.40 - 4.00 mU/L Final   02/17/2017 <0.01 (L) 0.40 - 4.00 mU/L Final   12/23/2016 <0.01 (L) 0.40 - 4.00 mU/L Final   11/25/2016 <0.01 (L) 0.40 - 4.00 mU/L Final     T4 Free   Date Value Ref Range Status   07/30/2019 1.96 (H) 0.76 - 1.46 ng/dL Final   04/15/2019 4.43 (H) 0.76 - 1.46 ng/dL Final   02/17/2017 0.85 0.76 - 1.46 ng/dL Final   12/23/2016 1.88 (H) 0.76 - 1.46 ng/dL Final   11/25/2016 2.56 (H) 0.76 - 1.46 ng/dL Final     Free T4   Date Value Ref Range Status   08/14/2023 1.23 0.90 - 1.70 ng/dL Final   02/13/2023 0.77 0.76 - 1.46 ng/dL Final   02/15/2022 1.25 0.76 - 1.46 ng/dL Final   11/30/2021 2.32 (H) 0.76 - 1.46 ng/dL Final   09/16/2021 2.62 (H) 0.76 - 1.46 ng/dL Final       Creatinine   Date Value Ref Range Status   10/07/2024 0.71 0.67 - 1.17 mg/dL Final   07/30/2019 0.77 0.66 - 1.25 mg/dL Final   ]  US THYROID 2/15/2022 4:19 PM     COMPARISON: None     HISTORY: Hyperthyroidism; Graves disease     FINDINGS:   Thyroid parenchyma: Heterogeneous  The right lobe of the thyroid measures: 5.3 x 1.9 x 2.4 cm  The left lobe of the thyroid measures: 5.1 x 2.2 x 1.7 cm  The thyroid isthmus measures: 0.3 cm     No discrete thyroid nodules.                                                           "             Impression:  Diffusely heterogeneous thyroid gland without discrete thyroid  nodules.     This note has been dictated using voice recognition software.  As a result, there may be errors in the documentation that have gone undetected.  Please consider this when interpreting information in this documentation.      Video-Visit Details    Type of service:  Video Visit  Joined the call at 1/13/2025, 10:37:20 am.  Left the call at 1/13/2025, 10:43:13 am.  You were on the call for 5 minutes .  Distant Location (provider location):  Off-site.   Platform used for Video Visit: Johnathan  The longitudinal plan of care for the diagnosis(es)/condition(s) as documented were addressed during this visit. Due to the added complexity in care, I will continue to support Tong in the subsequent management and with ongoing continuity of care.

## 2025-01-13 NOTE — LETTER
1/13/2025      Brandon Macias  [address]  [facility] [address]      Dear Colleague,    Thank you for referring your patient, Brandon Macias, to the [facility]. Please see a copy of my visit note below.    Endocrinology Clinic Visit    Chief Complaint: RECHECK     Information obtained from:Patient      Assessment/Treatment Plan:      Graves disease   Hyperthyroidism     Diagnosed in 2016.  On methimazole 2.5 mg daily. Other modalities of therapy including I131 discussed. He would like to continue with methimazole for now.   Clinically euthyroid.   Plan:      Methimazole 2.5 mg daily.   Check thyroid blood work today.     Test and/or medications prescribed today:  Orders Placed This Encounter   Procedures     T4 free     TSH     Thyroid stimulating immunoglobulin         Frieda Piper MD  Staff Endocrinologist    Division of Endocrinology and Diabetes      Subjective:         HPI: Brandon Macias is a 49 year old male with history of graves disease who is here for a follow up.   Diagnosed with graves disease in 2016.   Lost for follow up for > 3 years due to insurance issues previously.    Currently on methimazole 2.5 mg daily   No Anxiety, emotional lability, weakness, tremor, heat intolerance, increased perspiration, and no weight loss.  No  palpitations   No Eye irritation, tearing. Or double vision.    No smoking  Family history unknown - not sure.    BB and anticoagulation by cardiology.     Allergies   Allergen Reactions     Salicylic Acid Rash       Current Outpatient Medications   Medication Sig Dispense Refill     methimazole (TAPAZOLE) 5 MG tablet TAKE 1/2 TABLET(2.5 MG) BY MOUTH DAILY 45 tablet 0     metoprolol tartrate (LOPRESSOR) 25 MG tablet Take 1 tablet (25 mg) by mouth 2 times daily 180 tablet 3     XARELTO ANTICOAGULANT 20 MG TABS tablet TAKE 1 TABLET(20 MG) BY MOUTH DAILY WITH DINNER 90 tablet 3       Review of Systems     8 point review system (Constitutional, HENT, Eyes,  "Respiratory, Cardiovascular, Gastrointestinal, Genitourinary, Musculoskeletal,Neurological, Psychiatric/Behavioural, Endocrine) is negative or is as per HPI above    Past Medical History:   Diagnosis Date     Graves disease      Past Surgical History:   Procedure Laterality Date     CV ASD CLOSURE       NO HISTORY OF SURGERY         Objective:   Ht 1.651 m (5' 5\")   Wt 81.6 kg (180 lb)   BMI 29.95 kg/m      Constitutional: Pleasant no acute cardiopulmonary distress.   Psychological: appropriate mood and affect   In House Labs:   Lab Results   Component Value Date    A1C 5.1 11/18/2016       TSH   Date Value Ref Range Status   10/07/2024 0.34 0.30 - 4.20 uIU/mL Final   08/14/2023 2.01 0.30 - 4.20 uIU/mL Final   02/13/2023 2.20 0.40 - 4.00 mU/L Final   02/15/2022 <0.01 (L) 0.40 - 4.00 mU/L Final   11/30/2021 <0.01 (L) 0.40 - 4.00 mU/L Final   09/16/2021 <0.01 (L) 0.40 - 4.00 mU/L Final   07/30/2019 <0.01 (L) 0.40 - 4.00 mU/L Final   04/15/2019 <0.01 (L) 0.40 - 4.00 mU/L Final   02/17/2017 <0.01 (L) 0.40 - 4.00 mU/L Final   12/23/2016 <0.01 (L) 0.40 - 4.00 mU/L Final   11/25/2016 <0.01 (L) 0.40 - 4.00 mU/L Final     T4 Free   Date Value Ref Range Status   07/30/2019 1.96 (H) 0.76 - 1.46 ng/dL Final   04/15/2019 4.43 (H) 0.76 - 1.46 ng/dL Final   02/17/2017 0.85 0.76 - 1.46 ng/dL Final   12/23/2016 1.88 (H) 0.76 - 1.46 ng/dL Final   11/25/2016 2.56 (H) 0.76 - 1.46 ng/dL Final     Free T4   Date Value Ref Range Status   08/14/2023 1.23 0.90 - 1.70 ng/dL Final   02/13/2023 0.77 0.76 - 1.46 ng/dL Final   02/15/2022 1.25 0.76 - 1.46 ng/dL Final   11/30/2021 2.32 (H) 0.76 - 1.46 ng/dL Final   09/16/2021 2.62 (H) 0.76 - 1.46 ng/dL Final       Creatinine   Date Value Ref Range Status   10/07/2024 0.71 0.67 - 1.17 mg/dL Final   07/30/2019 0.77 0.66 - 1.25 mg/dL Final   ]  US THYROID 2/15/2022 4:19 PM     COMPARISON: None     HISTORY: Hyperthyroidism; Graves disease     FINDINGS:   Thyroid parenchyma: Heterogeneous  The right " lobe of the thyroid measures: 5.3 x 1.9 x 2.4 cm  The left lobe of the thyroid measures: 5.1 x 2.2 x 1.7 cm  The thyroid isthmus measures: 0.3 cm     No discrete thyroid nodules.                                                                       Impression:  Diffusely heterogeneous thyroid gland without discrete thyroid  nodules.     This note has been dictated using voice recognition software.  As a result, there may be errors in the documentation that have gone undetected.  Please consider this when interpreting information in this documentation.      Video-Visit Details    Type of service:  Video Visit  Joined the call at 1/13/2025, 10:37:20 am.  Left the call at 1/13/2025, 10:43:13 am.  You were on the call for 5 minutes .  Distant Location (provider location):  Off-site.   Platform used for Video Visit: Keelvar  The longitudinal plan of care for the diagnosis(es)/condition(s) as documented were addressed during this visit. Due to the added complexity in care, I will continue to support Tong in the subsequent management and with ongoing continuity of care.      Again, thank you for allowing me to participate in the care of your patient.        Sincerely,    Frieda Piper MD    Electronically signed

## 2025-01-13 NOTE — NURSING NOTE
Current patient location: out doing errands per pt    Is the patient currently in the state of [place]? YES    Visit mode: VIDEO    If the visit is dropped, the patient can be reconnected by:VIDEO VISIT: Text to cell phone:   Telephone Information:   Mobile [phone]       Will anyone else be joining the visit? NO  (If patient encounters technical issues they should call [phone]    Are changes needed to the allergy or medication list? No    Are refills needed on medications prescribed by this physician? Discuss with provider    Rooming Documentation:  Not applicable    Reason for visit: MAGNUS Browne VVF

## 2025-01-16 DIAGNOSIS — E05.00 GRAVES DISEASE: ICD-10-CM

## 2025-01-16 RX ORDER — METHIMAZOLE 5 MG/1
2.5 TABLET ORAL DAILY
Qty: 45 TABLET | Refills: 0 | OUTPATIENT
Start: 2025-01-16

## 2025-01-17 NOTE — TELEPHONE ENCOUNTER
methimazole (TAPAZOLE) 5 MG tablet 45 tablet 0 1/13/2025   Addressed in a different encounter> [facility] [id number]

## 2025-01-20 ENCOUNTER — ANCILLARY PROCEDURE (OUTPATIENT)
Dept: CARDIOLOGY | Facility: CLINIC | Age: 50
End: 2025-01-20
Attending: STUDENT IN AN ORGANIZED HEALTH CARE EDUCATION/TRAINING PROGRAM
Payer: COMMERCIAL

## 2025-01-20 DIAGNOSIS — R00.1 BRADYCARDIA: ICD-10-CM

## 2025-01-20 DIAGNOSIS — R94.31 ABNORMAL ELECTROCARDIOGRAM: ICD-10-CM

## 2025-01-20 DIAGNOSIS — I48.0 PAROXYSMAL A-FIB (H): ICD-10-CM

## 2025-01-20 LAB — LVEF ECHO: NORMAL

## 2025-01-20 PROCEDURE — 93306 TTE W/DOPPLER COMPLETE: CPT | Performed by: INTERNAL MEDICINE

## 2025-02-21 DIAGNOSIS — I63.332 CEREBROVASCULAR ACCIDENT (CVA) DUE TO THROMBOSIS OF LEFT POSTERIOR CEREBRAL ARTERY (H): ICD-10-CM

## 2025-02-21 DIAGNOSIS — I48.0 PAROXYSMAL A-FIB (H): ICD-10-CM

## 2025-02-24 RX ORDER — RIVAROXABAN 20 MG/1
20 TABLET, FILM COATED ORAL
Qty: 90 TABLET | Refills: 3 | Status: SHIPPED | OUTPATIENT
Start: 2025-02-24

## 2025-03-04 NOTE — PATIENT INSTRUCTIONS
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 This means you have a thyroid gland that makes too much thyroid hormone.     Graves' disease is treated by taking a medication like methimazole as you are currently doing. .  Please continue to take this medication at the dose indicated below.   Methimazole is usually well tolerated and safe that it has a rare and serious side effects.  are and serious side effects are  1.  Agranulocytosis: this is a loss of the white cell count that fights an infection.  This occurs approximately 1 in 200 people  2. liver problems this is even more rare than a granulocytosis but it can be very serious   If you develop the following symptoms while taking methimazole please notify your doctor immediately: fever, signs of infection such as sore throat: Or flulike illness, nausea, vomiting, abdominal pain.  any time you have an infection please have your complete blood count checked to make sure that your white cell count is stable.        Please check blood work today and we will adjust the dose of methimazole based on the results.  Please check thyroid blood work every 3 months as long as you are on methimazole treatment            PAP download reviewed.  Good compliance and control.

## 2025-04-19 ENCOUNTER — HEALTH MAINTENANCE LETTER (OUTPATIENT)
Age: 50
End: 2025-04-19

## 2025-05-04 DIAGNOSIS — E05.00 GRAVES DISEASE: ICD-10-CM

## 2025-05-06 RX ORDER — METHIMAZOLE 5 MG/1
2.5 TABLET ORAL DAILY
Qty: 45 TABLET | Refills: 0 | Status: SHIPPED | OUTPATIENT
Start: 2025-05-06

## 2025-05-06 NOTE — TELEPHONE ENCOUNTER
Last Written Prescription:  methimazole (TAPAZOLE) 5 MG tablet 45 tablet 0 1/13/2025     ----------------------  Last Visit Date: 1/13/25  Future Visit Date: 9/16/25  ----------------------    Refill decision: Medication unable to be refilled by RN due to: Medication not included in refill protocol policy   NOT ON ENDOCRINE REFILL PROTOCOL        Request from pharmacy:  Requested Prescriptions   Pending Prescriptions Disp Refills    methimazole (TAPAZOLE) 5 MG tablet [Pharmacy Med Name: METHIMAZOLE 5MG TABLETS] 45 tablet 0     Sig: TAKE 1/2 TABLET(2.5 MG) BY MOUTH DAILY       Anti-Thyroid Agents Protocol Passed - 5/6/2025  3:20 PM        Passed - Medication is active on med list and the sig matches. RN to manually verify dose and sig if red X/fail.     If the protocol passes (green check), you do not need to verify med dose and sig.    A prescription matches if they are the same clinical intention.    For Example: once daily and every morning are the same.    The protocol can not identify upper and lower case letters as matching and will fail.     For Example: Take 1 tablet (50 mg) by mouth daily     TAKE 1 TABLET (50 MG) BY MOUTH DAILY    For all fails (red x), verify dose and sig.    If the refill does match what is on file, the RN can still proceed to approve the refill request.       If they do not match, route to the appropriate provider.             Passed - Recent (12 month) or future (90 days) visit with authorizing provider s specialty     The patient must have completed an in-person or virtual visit within the past 12 months or has a future visit scheduled within the next 90 days with the authorizing provider s specialty.  Urgent care and e-visits do not qualify as an office visit for this protocol.          Passed - Medication indicated for associated diagnosis     The medication is prescribed for one or more of the following conditions:        Hyperthyroidism       Disorder of thyroid gland        Thyrotoxicosis       Thyroid storm       Thyroid eye disease            Passed - Patient is age 18 or older        Passed - Normal TSH in past 12 months     Recent Labs   Lab Test 10/07/24  1051   TSH 0.34

## 2025-06-10 DIAGNOSIS — E05.00 GRAVES DISEASE: ICD-10-CM

## 2025-06-10 RX ORDER — METHIMAZOLE 5 MG/1
2.5 TABLET ORAL DAILY
Qty: 45 TABLET | Refills: 0 | Status: SHIPPED | OUTPATIENT
Start: 2025-06-10

## 2025-06-10 NOTE — TELEPHONE ENCOUNTER
Last Written Prescription:  methimazole (TAPAZOLE) 5 MG tablet 45 tablet 0 5/6/2025     ----------------------  Last Visit Date: 1/13/25  Future Visit Date: 9/16/25  ----------------------    Refill decision: Medication unable to be refilled by RN due to: Medication not included in refill protocol policy   NOT ON ENDOCRINE REFILL PROTOCOL    Request from pharmacy:  Requested Prescriptions   Pending Prescriptions Disp Refills    methimazole (TAPAZOLE) 5 MG tablet [Pharmacy Med Name: METHIMAZOLE 5MG TABLETS] 45 tablet 0     Sig: TAKE 1/2 TABLET(2.5 MG) BY MOUTH DAILY       Anti-Thyroid Agents Protocol Passed - 6/10/2025  4:12 PM        Passed - Medication is active on med list and the sig matches. RN to manually verify dose and sig if red X/fail.     If the protocol passes (green check), you do not need to verify med dose and sig.    A prescription matches if they are the same clinical intention.    For Example: once daily and every morning are the same.    The protocol can not identify upper and lower case letters as matching and will fail.     For Example: Take 1 tablet (50 mg) by mouth daily     TAKE 1 TABLET (50 MG) BY MOUTH DAILY    For all fails (red x), verify dose and sig.    If the refill does match what is on file, the RN can still proceed to approve the refill request.       If they do not match, route to the appropriate provider.             Passed - Recent (12 month) or future (90 days) visit with authorizing provider's specialty (provided they have been seen in the past 15 months)     The patient must have completed an in-person or virtual visit within the past 12 months or has a future visit scheduled within the next 90 days with the authorizing provider s specialty.  Urgent care and e-visits do not qualify as an office visit for this protocol.          Passed - Medication indicated for associated diagnosis     The medication is prescribed for one or more of the following conditions:         Hyperthyroidism       Disorder of thyroid gland       Thyrotoxicosis       Thyroid storm       Thyroid eye disease            Passed - Patient is age 18 or older        Passed - Normal TSH in past 12 months     Recent Labs   Lab Test 10/07/24  1051   TSH 0.34

## 2025-07-29 ENCOUNTER — PATIENT OUTREACH (OUTPATIENT)
Dept: CARE COORDINATION | Facility: CLINIC | Age: 50
End: 2025-07-29
Payer: COMMERCIAL